# Patient Record
Sex: FEMALE | Race: OTHER | HISPANIC OR LATINO
[De-identification: names, ages, dates, MRNs, and addresses within clinical notes are randomized per-mention and may not be internally consistent; named-entity substitution may affect disease eponyms.]

---

## 2017-05-18 ENCOUNTER — APPOINTMENT (OUTPATIENT)
Dept: GASTROENTEROLOGY | Facility: CLINIC | Age: 57
End: 2017-05-18

## 2017-05-18 VITALS
SYSTOLIC BLOOD PRESSURE: 130 MMHG | RESPIRATION RATE: 16 BRPM | WEIGHT: 144 LBS | HEIGHT: 58 IN | BODY MASS INDEX: 30.23 KG/M2 | HEART RATE: 85 BPM | OXYGEN SATURATION: 98 % | DIASTOLIC BLOOD PRESSURE: 87 MMHG

## 2017-05-18 DIAGNOSIS — K58.1 IRRITABLE BOWEL SYNDROME WITH CONSTIPATION: ICD-10-CM

## 2017-05-18 PROBLEM — Z00.00 ENCOUNTER FOR PREVENTIVE HEALTH EXAMINATION: Status: ACTIVE | Noted: 2017-05-18

## 2017-08-07 LAB
ALBUMIN SERPL ELPH-MCNC: 4.3 G/DL
ALP BLD-CCNC: 97 U/L
ALT SERPL-CCNC: 14 U/L
AMYLASE/CREAT SERPL: 123 U/L
ANION GAP SERPL CALC-SCNC: 13 MMOL/L
APPEARANCE: CLEAR
AST SERPL-CCNC: 16 U/L
BACTERIA: ABNORMAL
BASOPHILS # BLD AUTO: 0.01 K/UL
BASOPHILS NFR BLD AUTO: 0.2 %
BILIRUB SERPL-MCNC: 0.3 MG/DL
BILIRUBIN URINE: NEGATIVE
BLOOD URINE: NEGATIVE
BUN SERPL-MCNC: 12 MG/DL
CALCIUM SERPL-MCNC: 9.2 MG/DL
CHLORIDE SERPL-SCNC: 103 MMOL/L
CO2 SERPL-SCNC: 26 MMOL/L
COLOR: YELLOW
CREAT SERPL-MCNC: 0.74 MG/DL
EOSINOPHIL # BLD AUTO: 0.11 K/UL
EOSINOPHIL NFR BLD AUTO: 1.9 %
GLUCOSE QUALITATIVE U: NORMAL MG/DL
GLUCOSE SERPL-MCNC: 92 MG/DL
HCT VFR BLD CALC: 44.7 %
HGB BLD-MCNC: 14.8 G/DL
HYALINE CASTS: 2 /LPF
IMM GRANULOCYTES NFR BLD AUTO: 0.2 %
KETONES URINE: NEGATIVE
LEUKOCYTE ESTERASE URINE: ABNORMAL
LPL SERPL-CCNC: 49 U/L
LYMPHOCYTES # BLD AUTO: 2.52 K/UL
LYMPHOCYTES NFR BLD AUTO: 43.8 %
MAN DIFF?: NORMAL
MCHC RBC-ENTMCNC: 29.2 PG
MCHC RBC-ENTMCNC: 33.1 GM/DL
MCV RBC AUTO: 88.3 FL
MICROSCOPIC-UA: NORMAL
MONOCYTES # BLD AUTO: 0.49 K/UL
MONOCYTES NFR BLD AUTO: 8.5 %
NEUTROPHILS # BLD AUTO: 2.61 K/UL
NEUTROPHILS NFR BLD AUTO: 45.4 %
NITRITE URINE: NEGATIVE
PH URINE: 6.5
PLATELET # BLD AUTO: 263 K/UL
POTASSIUM SERPL-SCNC: 4.3 MMOL/L
PROT SERPL-MCNC: 7.4 G/DL
PROTEIN URINE: NEGATIVE MG/DL
RBC # BLD: 5.06 M/UL
RBC # FLD: 13.3 %
RED BLOOD CELLS URINE: 10 /HPF
SODIUM SERPL-SCNC: 142 MMOL/L
SPECIFIC GRAVITY URINE: 1.02
SQUAMOUS EPITHELIAL CELLS: 12 /HPF
TSH SERPL-ACNC: 1.78 UIU/ML
UROBILINOGEN URINE: NORMAL MG/DL
WBC # FLD AUTO: 5.75 K/UL
WHITE BLOOD CELLS URINE: 8 /HPF

## 2017-08-14 ENCOUNTER — OUTPATIENT (OUTPATIENT)
Dept: OUTPATIENT SERVICES | Facility: HOSPITAL | Age: 57
LOS: 1 days | End: 2017-08-14
Payer: COMMERCIAL

## 2017-08-14 ENCOUNTER — APPOINTMENT (OUTPATIENT)
Dept: GASTROENTEROLOGY | Facility: GI CENTER | Age: 57
End: 2017-08-14
Payer: MEDICAID

## 2017-08-14 DIAGNOSIS — Z12.11 ENCOUNTER FOR SCREENING FOR MALIGNANT NEOPLASM OF COLON: ICD-10-CM

## 2017-08-14 PROCEDURE — T1013: CPT

## 2017-08-14 PROCEDURE — G0121: CPT

## 2017-08-14 PROCEDURE — 45378 DIAGNOSTIC COLONOSCOPY: CPT

## 2019-09-27 ENCOUNTER — APPOINTMENT (OUTPATIENT)
Dept: GASTROENTEROLOGY | Facility: CLINIC | Age: 59
End: 2019-09-27
Payer: MEDICAID

## 2019-09-27 VITALS
SYSTOLIC BLOOD PRESSURE: 115 MMHG | RESPIRATION RATE: 14 BRPM | HEART RATE: 76 BPM | WEIGHT: 147 LBS | HEIGHT: 58 IN | BODY MASS INDEX: 30.86 KG/M2 | DIASTOLIC BLOOD PRESSURE: 83 MMHG | OXYGEN SATURATION: 98 %

## 2019-09-27 DIAGNOSIS — Z78.9 OTHER SPECIFIED HEALTH STATUS: ICD-10-CM

## 2019-09-27 PROCEDURE — 99214 OFFICE O/P EST MOD 30 MIN: CPT

## 2019-09-27 RX ORDER — POLYETHYLENE GLYOCOL 3350, SODIUM CHLORIDE, SODIUM BICARBONATE AND POTASSIUM CHLORIDE 420; 11.2; 5.72; 1.48 G/4L; G/4L; G/4L; G/4L
420 POWDER, FOR SOLUTION NASOGASTRIC; ORAL
Qty: 1 | Refills: 0 | Status: COMPLETED | COMMUNITY
Start: 2017-05-18 | End: 2019-09-27

## 2019-09-27 NOTE — ASSESSMENT
[FreeTextEntry1] : The patient likely had an acute infectious gastroenteritis and has a residual esophagitis.  Will start pantoprazole 40 mg daily and schedule an EGD for further evaluation.

## 2019-09-27 NOTE — PHYSICAL EXAM
[General Appearance - In No Acute Distress] : in no acute distress [General Appearance - Alert] : alert [Sclera] : the sclera and conjunctiva were normal [Extraocular Movements] : extraocular movements were intact [Outer Ear] : the ears and nose were normal in appearance [PERRL With Normal Accommodation] : pupils were equal in size, round, and reactive to light [Oropharynx] : the oropharynx was normal [Jugular Venous Distention Increased] : there was no jugular-venous distention [Neck Appearance] : the appearance of the neck was normal [Neck Cervical Mass (___cm)] : no neck mass was observed [Thyroid Diffuse Enlargement] : the thyroid was not enlarged [Thyroid Nodule] : there were no palpable thyroid nodules [Auscultation Breath Sounds / Voice Sounds] : lungs were clear to auscultation bilaterally [Heart Rate And Rhythm] : heart rate was normal and rhythm regular [Murmurs] : no murmurs [Heart Sounds] : normal S1 and S2 [Heart Sounds Gallop] : no gallops [Edema] : there was no peripheral edema [Heart Sounds Pericardial Friction Rub] : no pericardial rub [Abdomen Tenderness] : non-tender [Abdomen Soft] : soft [Bowel Sounds] : normal bowel sounds [] : no hepato-splenomegaly [Abdomen Mass (___ Cm)] : no abdominal mass palpated [Cervical Lymph Nodes Enlarged Anterior Bilaterally] : anterior cervical [Cervical Lymph Nodes Enlarged Posterior Bilaterally] : posterior cervical [Nail Clubbing] : no clubbing  or cyanosis of the fingernails [Supraclavicular Lymph Nodes Enlarged Bilaterally] : supraclavicular [No Focal Deficits] : no focal deficits [Skin Color & Pigmentation] : normal skin color and pigmentation [Skin Turgor] : normal skin turgor [Oriented To Time, Place, And Person] : oriented to person, place, and time [Impaired Insight] : insight and judgment were intact [Affect] : the affect was normal

## 2019-09-27 NOTE — HISTORY OF PRESENT ILLNESS
[de-identified] : This is a 58-year-old woman presenting for evaluation for new onset acid reflux.  She is accompanied by her daughter who provides translation.  Patient reports that approximately 2 months ago, she had an acute gastroenteritis that involved one day of profuse vomiting, diarrhea, and left lower quadrant pain.  Since that time, she feels like food is coming back up into her throat.  She has not had any further episodes of vomiting.  She has never undergone an EGD before.  Her weight has been stable.  She denies any melena or rectal bleeding.

## 2019-09-27 NOTE — CONSULT LETTER
[Consult Letter:] : I had the pleasure of evaluating your patient, [unfilled]. [Dear  ___] : Dear  [unfilled], [Consult Closing:] : Thank you very much for allowing me to participate in the care of this patient.  If you have any questions, please do not hesitate to contact me. [Please see my note below.] : Please see my note below. [FreeTextEntry3] : Very truly yours,\par \par GABBY Tucker MD\par [facility]\par Gastroenterology at [place]\par [address], [address]\[place] [address]\par Tel [phone]\par Fax [phone]

## 2019-12-19 ENCOUNTER — RESULT REVIEW (OUTPATIENT)
Age: 59
End: 2019-12-19

## 2019-12-19 ENCOUNTER — OUTPATIENT (OUTPATIENT)
Dept: OUTPATIENT SERVICES | Facility: HOSPITAL | Age: 59
LOS: 1 days | End: 2019-12-19
Payer: COMMERCIAL

## 2019-12-19 ENCOUNTER — APPOINTMENT (OUTPATIENT)
Dept: GASTROENTEROLOGY | Facility: GI CENTER | Age: 59
End: 2019-12-19
Payer: MEDICAID

## 2019-12-19 DIAGNOSIS — K21.9 GASTRO-ESOPHAGEAL REFLUX DISEASE WITHOUT ESOPHAGITIS: ICD-10-CM

## 2019-12-19 DIAGNOSIS — K29.70 GASTRITIS, UNSPECIFIED, W/OUT BLEEDING: ICD-10-CM

## 2019-12-19 DIAGNOSIS — K25.7 CHRONIC GASTRIC ULCER W/OUT HEMORRHAGE OR PERFORATION: ICD-10-CM

## 2019-12-19 PROCEDURE — 44361 SMALL BOWEL ENDOSCOPY/BIOPSY: CPT

## 2019-12-19 PROCEDURE — 88305 TISSUE EXAM BY PATHOLOGIST: CPT | Mod: 26

## 2019-12-19 PROCEDURE — 43239 EGD BIOPSY SINGLE/MULTIPLE: CPT

## 2019-12-19 PROCEDURE — 88305 TISSUE EXAM BY PATHOLOGIST: CPT

## 2019-12-19 PROCEDURE — 88342 IMHCHEM/IMCYTCHM 1ST ANTB: CPT | Mod: 26

## 2019-12-19 PROCEDURE — 88342 IMHCHEM/IMCYTCHM 1ST ANTB: CPT

## 2019-12-19 RX ORDER — PANTOPRAZOLE 40 MG/1
40 TABLET, DELAYED RELEASE ORAL DAILY
Qty: 1 | Refills: 3 | Status: ACTIVE | COMMUNITY
Start: 2019-09-27 | End: 1900-01-01

## 2019-12-19 NOTE — PHYSICAL EXAM
[General Appearance - Alert] : alert [General Appearance - In No Acute Distress] : in no acute distress [PERRL With Normal Accommodation] : pupils were equal in size, round, and reactive to light [Sclera] : the sclera and conjunctiva were normal [Oropharynx] : the oropharynx was normal [Outer Ear] : the ears and nose were normal in appearance [Extraocular Movements] : extraocular movements were intact [Neck Appearance] : the appearance of the neck was normal [Neck Cervical Mass (___cm)] : no neck mass was observed [Thyroid Diffuse Enlargement] : the thyroid was not enlarged [Jugular Venous Distention Increased] : there was no jugular-venous distention [Thyroid Nodule] : there were no palpable thyroid nodules [Auscultation Breath Sounds / Voice Sounds] : lungs were clear to auscultation bilaterally [Heart Sounds] : normal S1 and S2 [Heart Rate And Rhythm] : heart rate was normal and rhythm regular [Murmurs] : no murmurs [Heart Sounds Pericardial Friction Rub] : no pericardial rub [Heart Sounds Gallop] : no gallops [Edema] : there was no peripheral edema [Bowel Sounds] : normal bowel sounds [Abdomen Soft] : soft [Abdomen Mass (___ Cm)] : no abdominal mass palpated [Abdomen Tenderness] : non-tender [] : no hepato-splenomegaly [Cervical Lymph Nodes Enlarged Anterior Bilaterally] : anterior cervical [Supraclavicular Lymph Nodes Enlarged Bilaterally] : supraclavicular [Cervical Lymph Nodes Enlarged Posterior Bilaterally] : posterior cervical [Skin Color & Pigmentation] : normal skin color and pigmentation [Nail Clubbing] : no clubbing  or cyanosis of the fingernails [Skin Turgor] : normal skin turgor [Oriented To Time, Place, And Person] : oriented to person, place, and time [No Focal Deficits] : no focal deficits [Affect] : the affect was normal [Impaired Insight] : insight and judgment were intact

## 2019-12-19 NOTE — ASSESSMENT
[FreeTextEntry1] : This is a 58-year-old woman presenting for an EGD to evaluate for history of gastroesophageal reflux disease.  The exam was notable for a regular Z line at 35 cm in the incisor orifice.  The gastric fundus and body were grossly normal in appearance, but the antrum was notable for patchy erythema and 2-3 small chronic-appearing erosions.  Biopsies were taken from the antrum (jar one) and the gastric body (jar two) to evaluate for Helicobacter pylori and gastric intestinal metaplasia.  The duodenal bulb, second portion of the duodenum, and third portion of the duodenum were grossly normal in appearance.\par \par Followup pathology\par PPI

## 2019-12-19 NOTE — PROCEDURE
[With Biopsy] : with biopsy [GERD] : GERD [Procedure Explained] : The procedure was explained [Allergies Reviewed] : allergies reviewed. [Risks] : Risks [Benefits] : benefits [Alternatives] : alternatives [Patient] : the patient [Consent Obtained] : written consent was obtained prior to the procedure and is detailed in the patient's record [Cardiac Monitor] : cardiac monitor [Automated Blood Pressure Cuff] : automated blood pressure cuff [Pulse Oximeter] : pulse oximeter [Propofol ___ mg IV] : Propofol [unfilled] ~Umg intravenously [2] : 2 [Performed By: ___] : Performed by:  OLGA [Sedation Clearance] : the patient was cleared for moderate sedation [Erosion] : erosion [Erythema] : erythema [Normal] : Normal [Sent to Pathology] : was sent to pathology for analysis [Tolerated Well] : the patient tolerated the procedure well [Vital Signs Stable] : the vital signs were stable [de-identified] : [employer] [id number]  [de-identified] : Z-line regular at 35 cm from incisors

## 2019-12-19 NOTE — PHYSICAL EXAM
[General Appearance - Alert] : alert [General Appearance - In No Acute Distress] : in no acute distress [PERRL With Normal Accommodation] : pupils were equal in size, round, and reactive to light [Sclera] : the sclera and conjunctiva were normal [Outer Ear] : the ears and nose were normal in appearance [Oropharynx] : the oropharynx was normal [Extraocular Movements] : extraocular movements were intact [Jugular Venous Distention Increased] : there was no jugular-venous distention [Neck Appearance] : the appearance of the neck was normal [Neck Cervical Mass (___cm)] : no neck mass was observed [Thyroid Diffuse Enlargement] : the thyroid was not enlarged [Thyroid Nodule] : there were no palpable thyroid nodules [Auscultation Breath Sounds / Voice Sounds] : lungs were clear to auscultation bilaterally [Heart Sounds] : normal S1 and S2 [Heart Rate And Rhythm] : heart rate was normal and rhythm regular [Murmurs] : no murmurs [Heart Sounds Pericardial Friction Rub] : no pericardial rub [Heart Sounds Gallop] : no gallops [Bowel Sounds] : normal bowel sounds [Edema] : there was no peripheral edema [Abdomen Soft] : soft [Abdomen Tenderness] : non-tender [] : no hepato-splenomegaly [Abdomen Mass (___ Cm)] : no abdominal mass palpated [Supraclavicular Lymph Nodes Enlarged Bilaterally] : supraclavicular [Cervical Lymph Nodes Enlarged Anterior Bilaterally] : anterior cervical [Cervical Lymph Nodes Enlarged Posterior Bilaterally] : posterior cervical [Skin Color & Pigmentation] : normal skin color and pigmentation [Nail Clubbing] : no clubbing  or cyanosis of the fingernails [Skin Turgor] : normal skin turgor [No Focal Deficits] : no focal deficits [Oriented To Time, Place, And Person] : oriented to person, place, and time [Impaired Insight] : insight and judgment were intact [Affect] : the affect was normal

## 2019-12-19 NOTE — PROCEDURE
[With Biopsy] : with biopsy [GERD] : GERD [Procedure Explained] : The procedure was explained [Allergies Reviewed] : allergies reviewed. [Risks] : Risks [Alternatives] : alternatives [Benefits] : benefits [Consent Obtained] : written consent was obtained prior to the procedure and is detailed in the patient's record [Patient] : the patient [Automated Blood Pressure Cuff] : automated blood pressure cuff [Cardiac Monitor] : cardiac monitor [Pulse Oximeter] : pulse oximeter [Propofol ___ mg IV] : Propofol [unfilled] ~Umg intravenously [2] : 2 [Sedation Clearance] : the patient was cleared for moderate sedation [Performed By: ___] : Performed by:  OLGA [Erosion] : erosion [Erythema] : erythema [Normal] : Normal [Sent to Pathology] : was sent to pathology for analysis [Tolerated Well] : the patient tolerated the procedure well [Vital Signs Stable] : the vital signs were stable [de-identified] : Olympus [id number]  [de-identified] : Z-line regular at 35 cm from incisors

## 2020-01-03 LAB — SURGICAL PATHOLOGY STUDY: SIGNIFICANT CHANGE UP

## 2020-01-07 ENCOUNTER — OTHER (OUTPATIENT)
Age: 60
End: 2020-01-07

## 2020-03-06 ENCOUNTER — APPOINTMENT (OUTPATIENT)
Dept: GASTROENTEROLOGY | Facility: CLINIC | Age: 60
End: 2020-03-06
Payer: MEDICAID

## 2020-03-06 VITALS
RESPIRATION RATE: 16 BRPM | HEIGHT: 58 IN | SYSTOLIC BLOOD PRESSURE: 108 MMHG | WEIGHT: 143 LBS | DIASTOLIC BLOOD PRESSURE: 72 MMHG | OXYGEN SATURATION: 98 % | HEART RATE: 68 BPM | BODY MASS INDEX: 30.02 KG/M2

## 2020-03-06 PROCEDURE — 99213 OFFICE O/P EST LOW 20 MIN: CPT

## 2020-03-06 RX ORDER — AMOXICILLIN 500 MG/1
500 TABLET, FILM COATED ORAL TWICE DAILY
Qty: 56 | Refills: 0 | Status: COMPLETED | COMMUNITY
Start: 2020-01-07 | End: 2020-03-06

## 2020-03-06 RX ORDER — METRONIDAZOLE 500 MG/1
500 TABLET ORAL TWICE DAILY
Qty: 28 | Refills: 0 | Status: COMPLETED | COMMUNITY
Start: 2020-01-07 | End: 2020-03-06

## 2020-03-06 NOTE — CONSULT LETTER
[Dear  ___] : Dear  [unfilled], [Courtesy Letter:] : I had the pleasure of seeing your patient, [unfilled], in my office today. [Please see my note below.] : Please see my note below. [Consult Closing:] : Thank you very much for allowing me to participate in the care of this patient.  If you have any questions, please do not hesitate to contact me. [FreeTextEntry3] : Very truly yours,\par \par GABBY Tucker MD\par  [facility]\par Gastroenterology at [place]\par [address], [address]\[place] [address]\par Tel [phone]\par Fax [phone]

## 2020-03-06 NOTE — REASON FOR VISIT
[Follow-Up: _____] : a [unfilled] follow-up visit [Other: _____] : [unfilled] [[employer] ] : provided by [employer]   [FreeTextEntry1] : [id number] [FreeTextEntry2] : Nima [TWNoteComboBox1] : Tagalog

## 2020-03-06 NOTE — HISTORY OF PRESENT ILLNESS
[de-identified] : Patient returns for follow up for H. pylori infection.  She completed the antibiotic course.  Currently taking omeprazole.  Symptoms under good control.  Reports some nausea and vomiting after eating fried food.  Does not have those symptoms when she avoids those foods.  No other complaints.

## 2022-07-05 ENCOUNTER — APPOINTMENT (OUTPATIENT)
Dept: GASTROENTEROLOGY | Facility: CLINIC | Age: 62
End: 2022-07-05

## 2022-07-05 VITALS
BODY MASS INDEX: 30.23 KG/M2 | WEIGHT: 144 LBS | HEART RATE: 74 BPM | SYSTOLIC BLOOD PRESSURE: 112 MMHG | RESPIRATION RATE: 14 BRPM | DIASTOLIC BLOOD PRESSURE: 78 MMHG | OXYGEN SATURATION: 98 % | HEIGHT: 58 IN

## 2022-07-05 DIAGNOSIS — R10.9 UNSPECIFIED ABDOMINAL PAIN: ICD-10-CM

## 2022-07-05 DIAGNOSIS — K62.5 HEMORRHAGE OF ANUS AND RECTUM: ICD-10-CM

## 2022-07-05 PROCEDURE — 99214 OFFICE O/P EST MOD 30 MIN: CPT

## 2022-07-06 NOTE — ASSESSMENT
[FreeTextEntry1] : 61 F with PMH of HTN, HLD presents for evaluation of epigastric pain. Patient states she has been having symptoms for 3 months, progressively getting worse. Associated with some nausea, no vomiting. Worse with eating. \par \par Pain abdomen:\par -will check labs today\par -c/w PPI, advised to increase dose to bid for now\par -will get CT abdomen \par -further plan pending work up above\par \par Hematochezia: one episode\par -workup as above \par -cscope 2017 was normal\par -further plan after above workup\par

## 2022-07-06 NOTE — HISTORY OF PRESENT ILLNESS
[de-identified] :  id [id number] \par \par 61 F with PMH of HTN, HLD presents for evaluation of epigastric pain. Patient states she has been having symptoms for 3 months, progressively getting worse. Associated with some nausea, no vomiting. Worse with eating. \par \par Pt was previously seen by Dr Tucker and had undergone an EGD at that time and was found to have H.Pylori, reports was prescribed meds for the same, not sure if she was tested for eradication. Has been taking ppi as prescribed by her pcp that has been helping.\par No fever/chills/black stool\par Also reports some constipation and one episode few months ago when she saw small amount of blood on toilet paper\par \par Last c scope 2017 normal, due for repeat in 2027\par last egd 2019\par \par no f/h/o gi malignancy

## 2022-07-06 NOTE — PHYSICAL EXAM
[General Appearance - Alert] : alert [General Appearance - In No Acute Distress] : in no acute distress [Sclera] : the sclera and conjunctiva were normal [Outer Ear] : the ears and nose were normal in appearance [Neck Appearance] : the appearance of the neck was normal [Normal] : the heart rate was normal [Abdomen Soft] : soft [No CVA Tenderness] : no ~M costovertebral angle tenderness [Abnormal Walk] : normal gait [Skin Color & Pigmentation] : normal skin color and pigmentation [] : no rash [Oriented To Time, Place, And Person] : oriented to person, place, and time [FreeTextEntry1] : yellow stool

## 2022-07-15 ENCOUNTER — APPOINTMENT (OUTPATIENT)
Dept: CT IMAGING | Facility: CLINIC | Age: 62
End: 2022-07-15

## 2022-07-15 ENCOUNTER — OUTPATIENT (OUTPATIENT)
Dept: OUTPATIENT SERVICES | Facility: HOSPITAL | Age: 62
LOS: 1 days | End: 2022-07-15
Payer: MEDICAID

## 2022-07-15 DIAGNOSIS — R10.9 UNSPECIFIED ABDOMINAL PAIN: ICD-10-CM

## 2022-07-15 PROCEDURE — 74177 CT ABD & PELVIS W/CONTRAST: CPT | Mod: 26

## 2022-07-15 PROCEDURE — 74177 CT ABD & PELVIS W/CONTRAST: CPT

## 2022-07-25 LAB
ALBUMIN SERPL ELPH-MCNC: 4.7 G/DL
ALP BLD-CCNC: 90 U/L
ALT SERPL-CCNC: 28 U/L
ANION GAP SERPL CALC-SCNC: 13 MMOL/L
AST SERPL-CCNC: 28 U/L
BASOPHILS # BLD AUTO: 0.02 K/UL
BASOPHILS NFR BLD AUTO: 0.3 %
BILIRUB SERPL-MCNC: 0.4 MG/DL
BUN SERPL-MCNC: 12 MG/DL
CALCIUM SERPL-MCNC: 10.1 MG/DL
CHLORIDE SERPL-SCNC: 102 MMOL/L
CO2 SERPL-SCNC: 25 MMOL/L
CREAT SERPL-MCNC: 0.71 MG/DL
EGFR: 97 ML/MIN/1.73M2
EOSINOPHIL # BLD AUTO: 0.18 K/UL
EOSINOPHIL NFR BLD AUTO: 2.8 %
GLIADIN IGA SER QL: <5 UNITS
GLIADIN IGG SER QL: <5 UNITS
GLIADIN PEPTIDE IGA SER-ACNC: NEGATIVE
GLIADIN PEPTIDE IGG SER-ACNC: NEGATIVE
GLUCOSE SERPL-MCNC: 108 MG/DL
HCT VFR BLD CALC: 45.6 %
HGB BLD-MCNC: 14.9 G/DL
IGA SER QL IEP: 308 MG/DL
IMM GRANULOCYTES NFR BLD AUTO: 0.2 %
LYMPHOCYTES # BLD AUTO: 2.61 K/UL
LYMPHOCYTES NFR BLD AUTO: 40 %
MAN DIFF?: NORMAL
MCHC RBC-ENTMCNC: 29 PG
MCHC RBC-ENTMCNC: 32.7 GM/DL
MCV RBC AUTO: 88.9 FL
MONOCYTES # BLD AUTO: 0.56 K/UL
MONOCYTES NFR BLD AUTO: 8.6 %
NEUTROPHILS # BLD AUTO: 3.15 K/UL
NEUTROPHILS NFR BLD AUTO: 48.1 %
PLATELET # BLD AUTO: 293 K/UL
POTASSIUM SERPL-SCNC: 4.8 MMOL/L
PROT SERPL-MCNC: 7.4 G/DL
RBC # BLD: 5.13 M/UL
RBC # FLD: 12.8 %
SODIUM SERPL-SCNC: 140 MMOL/L
TTG IGA SER IA-ACNC: <1.2 U/ML
TTG IGA SER-ACNC: NEGATIVE
TTG IGG SER IA-ACNC: <1.2 U/ML
TTG IGG SER IA-ACNC: NEGATIVE
UREA BREATH TEST QL: NEGATIVE
WBC # FLD AUTO: 6.53 K/UL

## 2023-04-09 ENCOUNTER — NON-APPOINTMENT (OUTPATIENT)
Age: 63
End: 2023-04-09

## 2024-04-24 ENCOUNTER — INPATIENT (INPATIENT)
Facility: HOSPITAL | Age: 64
LOS: 3 days | Discharge: HOME | DRG: 392 | End: 2024-04-28
Attending: HOSPITALIST | Admitting: HOSPITALIST
Payer: COMMERCIAL

## 2024-04-24 VITALS
TEMPERATURE: 98 F | SYSTOLIC BLOOD PRESSURE: 138 MMHG | HEART RATE: 79 BPM | RESPIRATION RATE: 18 BRPM | OXYGEN SATURATION: 94 % | DIASTOLIC BLOOD PRESSURE: 94 MMHG

## 2024-04-24 LAB
ALBUMIN SERPL ELPH-MCNC: 4.2 G/DL — SIGNIFICANT CHANGE UP (ref 3.3–5.2)
ALP SERPL-CCNC: 95 U/L — SIGNIFICANT CHANGE UP (ref 40–120)
ALT FLD-CCNC: 20 U/L — SIGNIFICANT CHANGE UP
ANION GAP SERPL CALC-SCNC: 13 MMOL/L — SIGNIFICANT CHANGE UP (ref 5–17)
AST SERPL-CCNC: 22 U/L — SIGNIFICANT CHANGE UP
BASOPHILS # BLD AUTO: 0.01 K/UL — SIGNIFICANT CHANGE UP (ref 0–0.2)
BASOPHILS NFR BLD AUTO: 0.1 % — SIGNIFICANT CHANGE UP (ref 0–2)
BILIRUB SERPL-MCNC: 0.3 MG/DL — LOW (ref 0.4–2)
BUN SERPL-MCNC: 11.5 MG/DL — SIGNIFICANT CHANGE UP (ref 8–20)
CALCIUM SERPL-MCNC: 9.5 MG/DL — SIGNIFICANT CHANGE UP (ref 8.4–10.5)
CHLORIDE SERPL-SCNC: 101 MMOL/L — SIGNIFICANT CHANGE UP (ref 96–108)
CO2 SERPL-SCNC: 24 MMOL/L — SIGNIFICANT CHANGE UP (ref 22–29)
CREAT SERPL-MCNC: 0.65 MG/DL — SIGNIFICANT CHANGE UP (ref 0.5–1.3)
D DIMER BLD IA.RAPID-MCNC: <150 NG/ML DDU — SIGNIFICANT CHANGE UP
EGFR: 99 ML/MIN/1.73M2 — SIGNIFICANT CHANGE UP
EOSINOPHIL # BLD AUTO: 0.11 K/UL — SIGNIFICANT CHANGE UP (ref 0–0.5)
EOSINOPHIL NFR BLD AUTO: 1.2 % — SIGNIFICANT CHANGE UP (ref 0–6)
GLUCOSE SERPL-MCNC: 110 MG/DL — HIGH (ref 70–99)
HCT VFR BLD CALC: 43.8 % — SIGNIFICANT CHANGE UP (ref 34.5–45)
HGB BLD-MCNC: 15.1 G/DL — SIGNIFICANT CHANGE UP (ref 11.5–15.5)
IMM GRANULOCYTES NFR BLD AUTO: 0.3 % — SIGNIFICANT CHANGE UP (ref 0–0.9)
LACTATE BLDV-MCNC: 1.5 MMOL/L — SIGNIFICANT CHANGE UP (ref 0.5–2)
LYMPHOCYTES # BLD AUTO: 3.41 K/UL — HIGH (ref 1–3.3)
LYMPHOCYTES # BLD AUTO: 37.6 % — SIGNIFICANT CHANGE UP (ref 13–44)
MCHC RBC-ENTMCNC: 30 PG — SIGNIFICANT CHANGE UP (ref 27–34)
MCHC RBC-ENTMCNC: 34.5 GM/DL — SIGNIFICANT CHANGE UP (ref 32–36)
MCV RBC AUTO: 86.9 FL — SIGNIFICANT CHANGE UP (ref 80–100)
MONOCYTES # BLD AUTO: 0.68 K/UL — SIGNIFICANT CHANGE UP (ref 0–0.9)
MONOCYTES NFR BLD AUTO: 7.5 % — SIGNIFICANT CHANGE UP (ref 2–14)
NEUTROPHILS # BLD AUTO: 4.82 K/UL — SIGNIFICANT CHANGE UP (ref 1.8–7.4)
NEUTROPHILS NFR BLD AUTO: 53.3 % — SIGNIFICANT CHANGE UP (ref 43–77)
PLATELET # BLD AUTO: 265 K/UL — SIGNIFICANT CHANGE UP (ref 150–400)
POTASSIUM SERPL-MCNC: 3.9 MMOL/L — SIGNIFICANT CHANGE UP (ref 3.5–5.3)
POTASSIUM SERPL-SCNC: 3.9 MMOL/L — SIGNIFICANT CHANGE UP (ref 3.5–5.3)
PROT SERPL-MCNC: 7.3 G/DL — SIGNIFICANT CHANGE UP (ref 6.6–8.7)
RBC # BLD: 5.04 M/UL — SIGNIFICANT CHANGE UP (ref 3.8–5.2)
RBC # FLD: 12.7 % — SIGNIFICANT CHANGE UP (ref 10.3–14.5)
SODIUM SERPL-SCNC: 138 MMOL/L — SIGNIFICANT CHANGE UP (ref 135–145)
WBC # BLD: 9.06 K/UL — SIGNIFICANT CHANGE UP (ref 3.8–10.5)
WBC # FLD AUTO: 9.06 K/UL — SIGNIFICANT CHANGE UP (ref 3.8–10.5)

## 2024-04-24 PROCEDURE — 99285 EMERGENCY DEPT VISIT HI MDM: CPT

## 2024-04-24 PROCEDURE — 71046 X-RAY EXAM CHEST 2 VIEWS: CPT | Mod: 26

## 2024-04-24 RX ORDER — KETOROLAC TROMETHAMINE 30 MG/ML
15 SYRINGE (ML) INJECTION ONCE
Refills: 0 | Status: DISCONTINUED | OUTPATIENT
Start: 2024-04-24 | End: 2024-04-24

## 2024-04-24 RX ORDER — MORPHINE SULFATE 50 MG/1
4 CAPSULE, EXTENDED RELEASE ORAL ONCE
Refills: 0 | Status: DISCONTINUED | OUTPATIENT
Start: 2024-04-24 | End: 2024-04-24

## 2024-04-24 RX ADMIN — Medication 15 MILLIGRAM(S): at 23:26

## 2024-04-24 RX ADMIN — MORPHINE SULFATE 4 MILLIGRAM(S): 50 CAPSULE, EXTENDED RELEASE ORAL at 23:26

## 2024-04-24 NOTE — ED ADULT TRIAGE NOTE - CHIEF COMPLAINT QUOTE
Pt BIBA from home, c/o RUQ abdominal pain on/off for 3 weeks, 9/10 sharp pain.  Pt states hx of constipation, LBM 2 days ago. Pt also c/o dizziness.  Hx of HTN/HLD, cholecystomy 1999 Pt BIBA from home, c/o RUQ abdominal pain on/off for 3 weeks w/nausea, 9/10 sharp pain.  Pt states hx of constipation, LBM 2 days ago. Pt also c/o dizziness.  Hx of HTN/HLD, cholecystomy 1999.  Last ibup at 5:30pm

## 2024-04-25 DIAGNOSIS — Z90.710 ACQUIRED ABSENCE OF BOTH CERVIX AND UTERUS: Chronic | ICD-10-CM

## 2024-04-25 DIAGNOSIS — R10.11 RIGHT UPPER QUADRANT PAIN: ICD-10-CM

## 2024-04-25 DIAGNOSIS — Z90.49 ACQUIRED ABSENCE OF OTHER SPECIFIED PARTS OF DIGESTIVE TRACT: Chronic | ICD-10-CM

## 2024-04-25 LAB
INR BLD: 1.09 RATIO — SIGNIFICANT CHANGE UP (ref 0.85–1.18)
PROTHROM AB SERPL-ACNC: 12.1 SEC — SIGNIFICANT CHANGE UP (ref 9.5–13)

## 2024-04-25 PROCEDURE — 74177 CT ABD & PELVIS W/CONTRAST: CPT | Mod: 26,MC

## 2024-04-25 PROCEDURE — 76705 ECHO EXAM OF ABDOMEN: CPT | Mod: 26

## 2024-04-25 PROCEDURE — 99223 1ST HOSP IP/OBS HIGH 75: CPT

## 2024-04-25 PROCEDURE — 93010 ELECTROCARDIOGRAM REPORT: CPT

## 2024-04-25 PROCEDURE — 74183 MRI ABD W/O CNTR FLWD CNTR: CPT | Mod: 26

## 2024-04-25 PROCEDURE — 99222 1ST HOSP IP/OBS MODERATE 55: CPT

## 2024-04-25 RX ORDER — SODIUM CHLORIDE 9 MG/ML
1000 INJECTION INTRAMUSCULAR; INTRAVENOUS; SUBCUTANEOUS
Refills: 0 | Status: DISCONTINUED | OUTPATIENT
Start: 2024-04-25 | End: 2024-04-28

## 2024-04-25 RX ORDER — TRAMADOL HYDROCHLORIDE 50 MG/1
25 TABLET ORAL EVERY 6 HOURS
Refills: 0 | Status: DISCONTINUED | OUTPATIENT
Start: 2024-04-25 | End: 2024-04-25

## 2024-04-25 RX ORDER — ATORVASTATIN CALCIUM 80 MG/1
1 TABLET, FILM COATED ORAL
Refills: 0 | DISCHARGE

## 2024-04-25 RX ORDER — LANOLIN ALCOHOL/MO/W.PET/CERES
3 CREAM (GRAM) TOPICAL AT BEDTIME
Refills: 0 | Status: DISCONTINUED | OUTPATIENT
Start: 2024-04-25 | End: 2024-04-28

## 2024-04-25 RX ORDER — ACETAMINOPHEN 500 MG
650 TABLET ORAL EVERY 6 HOURS
Refills: 0 | Status: DISCONTINUED | OUTPATIENT
Start: 2024-04-25 | End: 2024-04-28

## 2024-04-25 RX ORDER — ERTAPENEM SODIUM 1 G/1
1000 INJECTION, POWDER, LYOPHILIZED, FOR SOLUTION INTRAMUSCULAR; INTRAVENOUS ONCE
Refills: 0 | Status: DISCONTINUED | OUTPATIENT
Start: 2024-04-26 | End: 2024-04-26

## 2024-04-25 RX ORDER — INDOMETHACIN 50 MG
100 CAPSULE ORAL ONCE
Refills: 0 | Status: DISCONTINUED | OUTPATIENT
Start: 2024-04-25 | End: 2024-04-26

## 2024-04-25 RX ORDER — ENOXAPARIN SODIUM 100 MG/ML
40 INJECTION SUBCUTANEOUS EVERY 24 HOURS
Refills: 0 | Status: DISCONTINUED | OUTPATIENT
Start: 2024-04-25 | End: 2024-04-28

## 2024-04-25 RX ORDER — ONDANSETRON 8 MG/1
4 TABLET, FILM COATED ORAL EVERY 8 HOURS
Refills: 0 | Status: DISCONTINUED | OUTPATIENT
Start: 2024-04-25 | End: 2024-04-28

## 2024-04-25 RX ORDER — ATORVASTATIN CALCIUM 80 MG/1
40 TABLET, FILM COATED ORAL AT BEDTIME
Refills: 0 | Status: DISCONTINUED | OUTPATIENT
Start: 2024-04-25 | End: 2024-04-28

## 2024-04-25 RX ORDER — MORPHINE SULFATE 50 MG/1
4 CAPSULE, EXTENDED RELEASE ORAL ONCE
Refills: 0 | Status: DISCONTINUED | OUTPATIENT
Start: 2024-04-25 | End: 2024-04-25

## 2024-04-25 RX ORDER — KETOROLAC TROMETHAMINE 30 MG/ML
15 SYRINGE (ML) INJECTION EVERY 6 HOURS
Refills: 0 | Status: DISCONTINUED | OUTPATIENT
Start: 2024-04-25 | End: 2024-04-26

## 2024-04-25 RX ORDER — MORPHINE SULFATE 50 MG/1
2 CAPSULE, EXTENDED RELEASE ORAL EVERY 4 HOURS
Refills: 0 | Status: DISCONTINUED | OUTPATIENT
Start: 2024-04-25 | End: 2024-04-28

## 2024-04-25 RX ADMIN — Medication 15 MILLIGRAM(S): at 15:46

## 2024-04-25 RX ADMIN — ENOXAPARIN SODIUM 40 MILLIGRAM(S): 100 INJECTION SUBCUTANEOUS at 15:46

## 2024-04-25 RX ADMIN — MORPHINE SULFATE 4 MILLIGRAM(S): 50 CAPSULE, EXTENDED RELEASE ORAL at 05:24

## 2024-04-25 RX ADMIN — SODIUM CHLORIDE 100 MILLILITER(S): 9 INJECTION INTRAMUSCULAR; INTRAVENOUS; SUBCUTANEOUS at 08:52

## 2024-04-25 RX ADMIN — ATORVASTATIN CALCIUM 40 MILLIGRAM(S): 80 TABLET, FILM COATED ORAL at 21:40

## 2024-04-25 NOTE — ED ADULT NURSE REASSESSMENT NOTE - NS ED NURSE REASSESS COMMENT FT1
Pt awake & A&Ox4. Airway patent. Breathing even and unlabored on room air. Denies chest pain & SOB. Pt safety maintained. Pending bed in CDU.

## 2024-04-25 NOTE — ED PROVIDER NOTE - PHYSICAL EXAMINATION
Gen: NAD, AOx3  Head: NCAT  HEENT: oral mucosa moist, normal conjunctiva, oropharynx clear without exudate or erythema  Lung: CTAB, no respiratory distress, no wheezing, rales, rhonchi  CV: normal s1/s2, rrr, no murmurs, Normal perfusion, pulses 2+ throughout  Abd: soft,  diffuse tenderness palpation, however most tenderness in right upper quadrant  MSK: No edema, no visible deformities, full range of motion in all 4 extremities  Neuro: No focal neurologic deficits  Skin: No rash   Psych: normal affect

## 2024-04-25 NOTE — CONSULT NOTE ADULT - ASSESSMENT
64-year-old female with past medical history of HLD, PSH of cholecystectomy (26 years ago), presented to ED with epigastric and RUQ pain x 3 weeks, with associated nausea and NBNB.  emesis x 1 week.    Abdominal pain, nausea, vomiting:  Dilated CBD:   CT abd shows hepatic steatosis. mild intrahepatic and extra hepatic biliary ductal dilatation with common bile duct measuring up to 1.4 cm with distal tapering. No discrete choledocholith. S/p cholecystectomy. US abd shows CBD 1.2 cm.  Liver enzymes normal. No thrombocytopenia, normal albumin level.     Plan:  CT abd with no evidence of choledocholithiasis. Dilated CBD 14 mm is not significant and is a normal finding as she is s/p Cholecystectomy. Her liver enzymes are norm al. No evidence of ascending cholangitis.  Cont to trend LAEs, coags, CBC  MRCP ordered to r/o Choledocholithiasis  Further plan pending MRCP report. May need ERCP if + choledocholithiasis   Can start diet today from GI standpoint  Antiemetic a needed   Further care per primary team

## 2024-04-25 NOTE — ED PROVIDER NOTE - OBJECTIVE STATEMENT
64-year-old female with  no significant past medical history, status post cholecystectomy in [place] approximately 26 years ago presenting with  right upper quadrant pain x 3 weeks, at first was intermittent however  now constant.  No fevers or chills,  no diarrhea, associated with several episodes of nonbloody nonbilious vomiting.

## 2024-04-25 NOTE — H&P ADULT - ASSESSMENT
64 yo F with hx of cholelithiasis s/p cholecystectomy 26 years ago and hyperlipidemia presents to the ED for right upper quadrant abdominal abdominal pain.    RUQ pain, r/o Choledocholithiasis   CBD dilation on CT and US  LFTs WNL  lipase 59   - admit to medicine  - Monitor LFTs  - GI consulted  - MRCP, ordered  - IVFs  - CLD  - Pain control tylenol, toradol and morphine  - Antiemetic a needed     HLD  - resume statin    DVT ppx: Lovenox

## 2024-04-25 NOTE — H&P ADULT - NSHPPHYSICALEXAM_GEN_ALL_CORE
PHYSICAL EXAM:  Constitutional: No acute distress, alert and oriented by 3  HEENT: AT/NC, EOMI, PERRLA, Normal conjunctiva, no pharyngreal erythema, moist oral mucosa  Respiratory: CTA BL, equal breath sounds, no crackles or wheezing  Cardiovascular: RRR, no edema  Gastrointestinal: soft, + RUQ tenderness, Non-distended + Bowel sounds, no rebound or guarding  Genitourinary: no kwok  Musculoskeletal: No joint edema  Neurological: CN 2-12 grossly intact, no focal deficits  Skin: warm, dry and intact  Psychiatric: normal mood and affect

## 2024-04-25 NOTE — ED ADULT NURSE NOTE - CHIEF COMPLAINT QUOTE
Pt BIBA from home, c/o RUQ abdominal pain on/off for 3 weeks w/nausea, 9/10 sharp pain.  Pt states hx of constipation, LBM 2 days ago. Pt also c/o dizziness.  Hx of HTN/HLD, cholecystomy 1999.  Last ibup at 5:30pm

## 2024-04-25 NOTE — ED ADULT NURSE NOTE - OBJECTIVE STATEMENT
patient presents  RUQ abd pain that started few hours pta, patient denies nausea, vomiting, fever or urinary symptoms.  +

## 2024-04-25 NOTE — CONSULT NOTE ADULT - SUBJECTIVE AND OBJECTIVE BOX
HISTORY OF PRESENT ILLNESS: 64-year-old female with past medical history of HLD, PSH of cholecystectomy in [place] approximately 26 years ago presented to ED with epigastric and RUQ pain x 3 weeks. Reported associated nausea and NBNB emesis x 1 week. Denies fever, chills,  diarrhea. In ED CT abd shows hepatic steatosis. mild intrahepatic and extra hepatic biliary ductal dilatation with common bile duct measuring up to 1.4 cm with distal tapering. No discrete choledocholith. S/p cholecystectomy. US abd shows CBD 1.2 cm. Lab reports with normal liver enzymes, britni synthetic function. No leucocytosis. Patient remains afebrile.  Patient denies use of anticoagulation, NSAIDs, or alcohol.     Review of Systems:  . Constitutional: No fever, chills  . HEENT: Negative  · Respiratory and Thorax: No shortness of breath, no cough  · Cardiovascular: No chest pain, palpitation, no dizziness  · Gastrointestinal: see above  · Genitourinary: No hematuria  · Musculoskeletal: Negative  · Neurological: negative  · Psychiatric: no agitation, no anxiety      PAST MEDICAL/SURGICAL HISTORY:    SOCIAL HISTORY:  - TOBACCO: Denies  - ALCOHOL: Denies  - ILLICIT DRUG USE: Denies    FAMILY HISTORY:  No known history of gastrointestinal or liver disease;      HOME MEDICATIONS:    INPATIENT MEDICATIONS:  MEDICATIONS  (STANDING):    MEDICATIONS  (PRN):    ALLERGIES:  No Known Allergies    T(C): 36.4 (04-25-24 @ 05:05), Max: 37.1 (04-24-24 @ 23:39)  HR: 60 (04-25-24 @ 05:05) (60 - 79)  BP: 109/72 (04-25-24 @ 05:05) (109/72 - 138/94)  RR: 20 (04-25-24 @ 05:05) (18 - 20)  SpO2: 99% (04-25-24 @ 05:05) (94% - 99%)      PHYSICAL EXAM:    Constitutional: No acute distress  Neuro: Awake alert, oriented  HEENT: anicteric sclerae  CV: regular rate, regular rhythm  Pulm/chest: lung sounds diminished bilaterally, no accessory muscle use noted  Abd: soft, nontender, nondistended +bowel sounds. No rigidity, rebound tenderness, or guarding  Ext: no edema  Skin: warm, no jaundice   Psych: calm, cooperative      LABS:             15.1   9.06  )-----------( 265      ( 04-24 @ 23:20 )             43.8         04-24    138  |  101  |  11.5  ----------------------------<  110<H>  3.9   |  24.0  |  0.65    Ca    9.5      24 Apr 2024 23:20    TPro  7.3  /  Alb  4.2  /  TBili  0.3<L>  /  DBili  x   /  AST  22  /  ALT  20  /  AlkPhos  95  04-24    LIVER FUNCTIONS - ( 24 Apr 2024 23:20 )  Alb: 4.2 g/dL / Pro: 7.3 g/dL / ALK PHOS: 95 U/L / ALT: 20 U/L / AST: 22 U/L / GGT: x             Lipase: 59 U/L (04-24-24 @ 23:20)        Urinalysis Basic - ( 24 Apr 2024 23:20 )    Color: x / Appearance: x / SG: x / pH: x  Gluc: 110 mg/dL / Ketone: x  / Bili: x / Urobili: x   Blood: x / Protein: x / Nitrite: x   Leuk Esterase: x / RBC: x / WBC x   Sq Epi: x / Non Sq Epi: x / Bacteria: x    < from: US Abdomen Upper Quadrant Right (04.25.24 @ 07:12) >  FINDINGS:  Liver: Increased echogenicity.  Bile ducts: Common bile duct measures 12 mm.  Gallbladder: Cholecystectomy.  Pancreas: Visualized portions are within normal limits.  Right kidney: 10.8 cm. No hydronephrosis. Renal cortical thinning.  Ascites: None.  IVC: Visualized portions are within normal limits.    IMPRESSION:  Hepatic steatosis.  Status post cholecystectomy.  CBD dilatation, similar to CT.    < end of copied text >    < from: CT Abdomen and Pelvis w/ IV Cont (04.25.24 @ 02:29) >  FINDINGS:  LOWER CHEST: Mild bibasilar dependent atelectasis.    LIVER: Hepatic steatosis.  BILE DUCTS: Mild intrahepatic and extra hepatic biliary ductal dilatation   with common bile duct measuring up to 1.4 cm with distal tapering. No   discrete choledocholith.  GALLBLADDER: Surgically absent.  SPLEEN: Within normal limits.  PANCREAS: Within normal limits.  ADRENALS: Within normal limits.  KIDNEYS/URETERS: Kidneys enhance symmetrically without hydronephrosis or   focal renal parenchymal lesion.    BLADDER: Underdistended.  REPRODUCTIVE ORGANS: Hysterectomy. Adnexa are unremarkable.    BOWEL: No bowel obstruction or overt bowel wall thickening. Appendix is   normal.  PERITONEUM: No ascites, pneumoperitoneum, or loculated collection. No   mesenteric lymphadenopathy.  VESSELS: Atherosclerotic calcifications of the aortoiliac tree. Normal   caliber abdominal aorta.  RETROPERITONEUM/LYMPH NODES: No lymphadenopathy.  ABDOMINAL WALL: Within normal limits.  BONES: Mild degenerative changes of the spine.    IMPRESSION:  Postcholecystectomy. Mild intrahepatic and extra hepatic biliary ductal   dilatation with common bile duct measuring up to 1.4 cm. No discrete   choledocholith. Correlate with LFTs. Sonogram orMRI/MRCP may be   considered, as clinically indicated.    < end of copied text >

## 2024-04-25 NOTE — ED PROVIDER NOTE - CLINICAL SUMMARY MEDICAL DECISION MAKING FREE TEXT BOX
64-year-old female with no past medical history, status post cholecystectomy presenting with right upper quadrant pain, severe, associated nausea vomiting.  Labs were obtained, showed slightly elevated lipase, otherwise were nonactionable.  CT of her abdomen pelvis was obtained, which showed biliary ductal  dilatation as well as 1.4 cm common bile duct.  MRCP ordered, GI consulted, patient admitted to medicine for further management for suspected retained stone.

## 2024-04-25 NOTE — H&P ADULT - HISTORY OF PRESENT ILLNESS
62 yo F with hx of cholelithiasis s/p cholecystectomy 26 years ago and hyperlipidemia presents to the ED for right upper quadrant abdominal abdominal pain. Pain started 3 weeks ago but was bearable. She denies diarrhea but notes constipation, Has not been eaitng much due to nausea. Over the past few days took Ibuprofen and Tylenol for worsening pain. No fevers or chills. Yesterday pain worsened so she came to the ER, She states her pain is similar to the pain she had when she had gallstone and had to have her gallbladder removed.   In the ED CT scan noted CBD dilation. GI was consulted. LFTs and bilirubin within normal limit. Mildly elevated lipase,

## 2024-04-25 NOTE — CONSULT NOTE ADULT - NS ATTEND AMEND GEN_ALL_CORE FT
I evaluated this pt. with my ACP and agree with the above assessment and management plan. Pt with RUQ pain and dilated CBD s/p lap etelvina over twenty years ago. Abdominal sonogram and CT scan both show dilated CBD. Liver chemistries are normal. MRCP ordered to r/o choledocholithiasis. OK for CLD today. Pantoprazole for GI mucosal cytoprotection. Await MRCP results. Repeat labs ordered for the AM. I reviewed her CT and RUQ US images and agree with the Radiologist's interpretations.

## 2024-04-26 LAB
ALBUMIN SERPL ELPH-MCNC: 3.4 G/DL — SIGNIFICANT CHANGE UP (ref 3.3–5.2)
ALP SERPL-CCNC: 63 U/L — SIGNIFICANT CHANGE UP (ref 40–120)
ALT FLD-CCNC: 14 U/L — SIGNIFICANT CHANGE UP
ANION GAP SERPL CALC-SCNC: 12 MMOL/L — SIGNIFICANT CHANGE UP (ref 5–17)
AST SERPL-CCNC: 13 U/L — SIGNIFICANT CHANGE UP
BASOPHILS # BLD AUTO: 0.01 K/UL — SIGNIFICANT CHANGE UP (ref 0–0.2)
BASOPHILS NFR BLD AUTO: 0.2 % — SIGNIFICANT CHANGE UP (ref 0–2)
BILIRUB SERPL-MCNC: 0.3 MG/DL — LOW (ref 0.4–2)
BUN SERPL-MCNC: 5.2 MG/DL — LOW (ref 8–20)
CALCIUM SERPL-MCNC: 8.4 MG/DL — SIGNIFICANT CHANGE UP (ref 8.4–10.5)
CHLORIDE SERPL-SCNC: 108 MMOL/L — SIGNIFICANT CHANGE UP (ref 96–108)
CO2 SERPL-SCNC: 24 MMOL/L — SIGNIFICANT CHANGE UP (ref 22–29)
CREAT SERPL-MCNC: 0.56 MG/DL — SIGNIFICANT CHANGE UP (ref 0.5–1.3)
EGFR: 103 ML/MIN/1.73M2 — SIGNIFICANT CHANGE UP
EOSINOPHIL # BLD AUTO: 0.19 K/UL — SIGNIFICANT CHANGE UP (ref 0–0.5)
EOSINOPHIL NFR BLD AUTO: 3.6 % — SIGNIFICANT CHANGE UP (ref 0–6)
GLUCOSE SERPL-MCNC: 94 MG/DL — SIGNIFICANT CHANGE UP (ref 70–99)
HCT VFR BLD CALC: 41.3 % — SIGNIFICANT CHANGE UP (ref 34.5–45)
HGB BLD-MCNC: 13.6 G/DL — SIGNIFICANT CHANGE UP (ref 11.5–15.5)
IMM GRANULOCYTES NFR BLD AUTO: 0.2 % — SIGNIFICANT CHANGE UP (ref 0–0.9)
LYMPHOCYTES # BLD AUTO: 2.66 K/UL — SIGNIFICANT CHANGE UP (ref 1–3.3)
LYMPHOCYTES # BLD AUTO: 50.7 % — HIGH (ref 13–44)
MAGNESIUM SERPL-MCNC: 1.8 MG/DL — SIGNIFICANT CHANGE UP (ref 1.8–2.6)
MCHC RBC-ENTMCNC: 29.6 PG — SIGNIFICANT CHANGE UP (ref 27–34)
MCHC RBC-ENTMCNC: 32.9 GM/DL — SIGNIFICANT CHANGE UP (ref 32–36)
MCV RBC AUTO: 90 FL — SIGNIFICANT CHANGE UP (ref 80–100)
MONOCYTES # BLD AUTO: 0.44 K/UL — SIGNIFICANT CHANGE UP (ref 0–0.9)
MONOCYTES NFR BLD AUTO: 8.4 % — SIGNIFICANT CHANGE UP (ref 2–14)
NEUTROPHILS # BLD AUTO: 1.94 K/UL — SIGNIFICANT CHANGE UP (ref 1.8–7.4)
NEUTROPHILS NFR BLD AUTO: 36.9 % — LOW (ref 43–77)
PLATELET # BLD AUTO: 241 K/UL — SIGNIFICANT CHANGE UP (ref 150–400)
POTASSIUM SERPL-MCNC: 3.9 MMOL/L — SIGNIFICANT CHANGE UP (ref 3.5–5.3)
POTASSIUM SERPL-SCNC: 3.9 MMOL/L — SIGNIFICANT CHANGE UP (ref 3.5–5.3)
PROT SERPL-MCNC: 5.9 G/DL — LOW (ref 6.6–8.7)
RBC # BLD: 4.59 M/UL — SIGNIFICANT CHANGE UP (ref 3.8–5.2)
RBC # FLD: 12.9 % — SIGNIFICANT CHANGE UP (ref 10.3–14.5)
SODIUM SERPL-SCNC: 143 MMOL/L — SIGNIFICANT CHANGE UP (ref 135–145)
WBC # BLD: 5.25 K/UL — SIGNIFICANT CHANGE UP (ref 3.8–10.5)
WBC # FLD AUTO: 5.25 K/UL — SIGNIFICANT CHANGE UP (ref 3.8–10.5)

## 2024-04-26 PROCEDURE — 99232 SBSQ HOSP IP/OBS MODERATE 35: CPT

## 2024-04-26 PROCEDURE — 99231 SBSQ HOSP IP/OBS SF/LOW 25: CPT

## 2024-04-26 RX ORDER — SENNA PLUS 8.6 MG/1
2 TABLET ORAL AT BEDTIME
Refills: 0 | Status: DISCONTINUED | OUTPATIENT
Start: 2024-04-26 | End: 2024-04-28

## 2024-04-26 RX ORDER — POLYETHYLENE GLYCOL 3350 17 G/17G
17 POWDER, FOR SOLUTION ORAL DAILY
Refills: 0 | Status: DISCONTINUED | OUTPATIENT
Start: 2024-04-26 | End: 2024-04-28

## 2024-04-26 RX ORDER — PANTOPRAZOLE SODIUM 20 MG/1
40 TABLET, DELAYED RELEASE ORAL DAILY
Refills: 0 | Status: DISCONTINUED | OUTPATIENT
Start: 2024-04-26 | End: 2024-04-28

## 2024-04-26 RX ADMIN — SODIUM CHLORIDE 100 MILLILITER(S): 9 INJECTION INTRAMUSCULAR; INTRAVENOUS; SUBCUTANEOUS at 20:02

## 2024-04-26 RX ADMIN — MORPHINE SULFATE 2 MILLIGRAM(S): 50 CAPSULE, EXTENDED RELEASE ORAL at 06:56

## 2024-04-26 RX ADMIN — SODIUM CHLORIDE 100 MILLILITER(S): 9 INJECTION INTRAMUSCULAR; INTRAVENOUS; SUBCUTANEOUS at 12:42

## 2024-04-26 RX ADMIN — SENNA PLUS 2 TABLET(S): 8.6 TABLET ORAL at 21:21

## 2024-04-26 RX ADMIN — SODIUM CHLORIDE 100 MILLILITER(S): 9 INJECTION INTRAMUSCULAR; INTRAVENOUS; SUBCUTANEOUS at 06:25

## 2024-04-26 RX ADMIN — Medication 650 MILLIGRAM(S): at 21:00

## 2024-04-26 RX ADMIN — Medication 15 MILLIGRAM(S): at 06:25

## 2024-04-26 RX ADMIN — PANTOPRAZOLE SODIUM 40 MILLIGRAM(S): 20 TABLET, DELAYED RELEASE ORAL at 12:13

## 2024-04-26 RX ADMIN — POLYETHYLENE GLYCOL 3350 17 GRAM(S): 17 POWDER, FOR SOLUTION ORAL at 12:12

## 2024-04-26 RX ADMIN — Medication 10 MILLIGRAM(S): at 14:38

## 2024-04-26 RX ADMIN — Medication 650 MILLIGRAM(S): at 20:02

## 2024-04-26 RX ADMIN — ATORVASTATIN CALCIUM 40 MILLIGRAM(S): 80 TABLET, FILM COATED ORAL at 21:21

## 2024-04-26 RX ADMIN — ENOXAPARIN SODIUM 40 MILLIGRAM(S): 100 INJECTION SUBCUTANEOUS at 15:28

## 2024-04-26 NOTE — PROGRESS NOTE ADULT - SUBJECTIVE AND OBJECTIVE BOX
Right upper quadrant abdominal pain    HPI:  64 yo F with hx of cholelithiasis s/p cholecystectomy 26 years ago and hyperlipidemia presents to the ED for right upper quadrant abdominal abdominal pain. Pain started 3 weeks ago but was bearable. She denies diarrhea but notes constipation, Has not been eaitng much due to nausea. Over the past few days took Ibuprofen and Tylenol for worsening pain. No fevers or chills. Yesterday pain worsened so she came to the ER, She states her pain is similar to the pain she had when she had gallstone and had to have her gallbladder removed.   In the ED CT scan noted CBD dilation. GI was consulted. LFTs and bilirubin within normal limit. Mildly elevated lipase, (25 Apr 2024 09:04)    Interval History:  Patient was seen and examined at bedside around 9:30 am with  - Leann.   Still complaining of a lot of RUQ abdominal pain.   Denies nausea or vomiting but no BM for 2-3 days.     ROS:  As per interval history otherwise unremarkable.    PHYSICAL EXAM:  Vital Signs   T(C): 36.5 (26 Apr 2024 10:30), Max: 36.6 (25 Apr 2024 15:45)  T(F): 97.7 (26 Apr 2024 10:30), Max: 97.8 (25 Apr 2024 15:45)  HR: 55 (26 Apr 2024 10:30) (53 - 83)  BP: 145/79 (26 Apr 2024 10:30) (119/78 - 154/77)  RR: 12 (26 Apr 2024 10:30) (12 - 18)  SpO2: 93% (26 Apr 2024 10:30) (93% - 98%)  Parameters below as of 26 Apr 2024 10:30  Patient On (Oxygen Delivery Method): room air  General: Elderly female lying in bed comfortably. No acute distress  HEENT: EOMI. Clear conjunctivae. Moist mucus membrane  Neck: Supple.   Chest: Good air entry. No wheezing, rales or rhonchi.   Heart: Normal S1 & S2. RRR.   Abdomen: Non distended. Soft. Tender in RUQ. + BS  Ext: No pedal edema. No calf tenderness   Neuro: Awake and alert. No focal deficit. Speech clear.   Skin: Warm and Dry  Psychiatry: Normal mood and affect    I&O's Summary    25 Apr 2024 07:01 - 26 Apr 2024 07:00  --------------------------------------------------------  IN: 0 mL / OUT: 350 mL / NET: -350 mL    LABS:                        13.6   5.25  )-----------( 241      ( 26 Apr 2024 05:48 )             41.3     04-26    143  |  108  |  5.2<L>  ----------------------------<  94  3.9   |  24.0  |  0.56    Ca    8.4      26 Apr 2024 05:48  Mg     1.8     04-26    TPro  5.9<L>  /  Alb  3.4  /  TBili  0.3<L>  /  DBili  x   /  AST  13  /  ALT  14  /  AlkPhos  63  04-26    PT/INR - ( 25 Apr 2024 10:50 )   PT: 12.1 sec;   INR: 1.09 ratio      Urinalysis Basic - ( 26 Apr 2024 05:48 )    Color: x / Appearance: x / SG: x / pH: x  Gluc: 94 mg/dL / Ketone: x  / Bili: x / Urobili: x   Blood: x / Protein: x / Nitrite: x   Leuk Esterase: x / RBC: x / WBC x   Sq Epi: x / Non Sq Epi: x / Bacteria: x    RADIOLOGY & ADDITIONAL STUDIES:  Reviewed     MEDICATIONS  (STANDING):  atorvastatin 40 milliGRAM(s) Oral at bedtime  enoxaparin Injectable 40 milliGRAM(s) SubCutaneous every 24 hours  pantoprazole    Tablet 40 milliGRAM(s) Oral daily  polyethylene glycol 3350 17 Gram(s) Oral daily  sodium chloride 0.9%. 1000 milliLiter(s) (100 mL/Hr) IV Continuous <Continuous>    MEDICATIONS  (PRN):  acetaminophen     Tablet .. 650 milliGRAM(s) Oral every 6 hours PRN Temp greater or equal to 38C (100.4F), Mild Pain (1 - 3)  aluminum hydroxide/magnesium hydroxide/simethicone Suspension 30 milliLiter(s) Oral every 4 hours PRN Dyspepsia  ketorolac   Injectable 15 milliGRAM(s) IV Push every 6 hours PRN Moderate Pain (4 - 6)  melatonin 3 milliGRAM(s) Oral at bedtime PRN Insomnia  morphine  - Injectable 2 milliGRAM(s) IV Push every 4 hours PRN Severe Pain (7 - 10)  ondansetron Injectable 4 milliGRAM(s) IV Push every 8 hours PRN Nausea and/or Vomiting       Right upper quadrant abdominal pain    HPI:  62 yo F with hx of cholelithiasis s/p cholecystectomy 26 years ago and hyperlipidemia presents to the ED for right upper quadrant abdominal abdominal pain. Pain started 3 weeks ago but was bearable. She denies diarrhea but notes constipation, Has not been eaitng much due to nausea. Over the past few days took Ibuprofen and Tylenol for worsening pain. No fevers or chills. Yesterday pain worsened so she came to the ER, She states her pain is similar to the pain she had when she had gallstone and had to have her gallbladder removed.   In the ED CT scan noted CBD dilation. GI was consulted. LFTs and bilirubin within normal limit. Mildly elevated lipase, (25 Apr 2024 09:04)    Interval History:  Patient was seen and examined at bedside around 9:30 am with  - Leann.   Still complaining of a lot of RUQ abdominal pain.   Denies nausea or vomiting but no BM for 2-3 days.     ROS:  As per interval history otherwise unremarkable.    PHYSICAL EXAM:  Vital Signs   T(C): 36.5 (26 Apr 2024 10:30), Max: 36.6 (25 Apr 2024 15:45)  T(F): 97.7 (26 Apr 2024 10:30), Max: 97.8 (25 Apr 2024 15:45)  HR: 55 (26 Apr 2024 10:30) (53 - 83)  BP: 145/79 (26 Apr 2024 10:30) (119/78 - 154/77)  RR: 12 (26 Apr 2024 10:30) (12 - 18)  SpO2: 93% (26 Apr 2024 10:30) (93% - 98%)  Parameters below as of 26 Apr 2024 10:30  Patient On (Oxygen Delivery Method): room air  General: Elderly female lying in bed comfortably. No acute distress  HEENT: EOMI. Clear conjunctivae. Moist mucus membrane  Neck: Supple.   Chest: Good air entry. No wheezing, rales or rhonchi.   Heart: Normal S1 & S2. RRR.   Abdomen: Non distended. Soft. Tender in RUQ. + BS  Ext: No pedal edema. No calf tenderness   Neuro: Awake and alert. No focal deficit. Speech clear.   Skin: Warm and Dry  Psychiatry: Normal mood and affect    I&O's Summary    25 Apr 2024 07:01 - 26 Apr 2024 07:00  --------------------------------------------------------  IN: 0 mL / OUT: 350 mL / NET: -350 mL    LABS:                        13.6   5.25  )-----------( 241      ( 26 Apr 2024 05:48 )             41.3     04-26    143  |  108  |  5.2<L>  ----------------------------<  94  3.9   |  24.0  |  0.56    Ca    8.4      26 Apr 2024 05:48  Mg     1.8     04-26    TPro  5.9<L>  /  Alb  3.4  /  TBili  0.3<L>  /  DBili  x   /  AST  13  /  ALT  14  /  AlkPhos  63  04-26    PT/INR - ( 25 Apr 2024 10:50 )   PT: 12.1 sec;   INR: 1.09 ratio      Urinalysis Basic - ( 26 Apr 2024 05:48 )    Color: x / Appearance: x / SG: x / pH: x  Gluc: 94 mg/dL / Ketone: x  / Bili: x / Urobili: x   Blood: x / Protein: x / Nitrite: x   Leuk Esterase: x / RBC: x / WBC x   Sq Epi: x / Non Sq Epi: x / Bacteria: x    RADIOLOGY & ADDITIONAL STUDIES:  Reviewed     MEDICATIONS  (STANDING):  atorvastatin 40 milliGRAM(s) Oral at bedtime  enoxaparin Injectable 40 milliGRAM(s) SubCutaneous every 24 hours  pantoprazole    Tablet 40 milliGRAM(s) Oral daily  polyethylene glycol 3350 17 Gram(s) Oral daily  senna 2 Tablet(s) Oral at bedtime  sodium chloride 0.9%. 1000 milliLiter(s) (100 mL/Hr) IV Continuous <Continuous>    MEDICATIONS  (PRN):  acetaminophen     Tablet .. 650 milliGRAM(s) Oral every 6 hours PRN Temp greater or equal to 38C (100.4F), Mild Pain (1 - 3)  aluminum hydroxide/magnesium hydroxide/simethicone Suspension 30 milliLiter(s) Oral every 4 hours PRN Dyspepsia  dicyclomine Injectable 10 milliGRAM(s) IntraMuscular every 8 hours PRN Abdominal Pain  melatonin 3 milliGRAM(s) Oral at bedtime PRN Insomnia  morphine  - Injectable 2 milliGRAM(s) IV Push every 4 hours PRN Severe Pain (7 - 10)  ondansetron Injectable 4 milliGRAM(s) IV Push every 8 hours PRN Nausea and/or Vomiting

## 2024-04-26 NOTE — PROGRESS NOTE ADULT - ASSESSMENT
63 y/o F with PMHx HLD, PSH of cholecystectomy (26 years ago) presented to ED with epigastric and RUQ pain x 3 weeks with associated nausea and vomiting     #Abdominal pain, nausea, vomiting  #Dilated CBD s/p cholecystectomy   MR MRCP w/wo IV Cont (04.25.24)- Nonspecific dilatation of the biliary tree, CBD 14 mm.Tapered appearance of the distal duct without filling defect. Cholecystectomy. No MR visible common bile duct stone.  CT A/P w/ IV Cont (04.25.24)- Hepatic steatosis. Mild intrahepatic and extra hepatic biliary ductal dilatation with CBD measuring up to 1.4 cm with distal tapering. No discrete choledocholith.  US Abd (04.25.24)- Hepatic steatosis. Status post cholecystectomy. Common bile duct measures 12 mm.    - Continue to trend CBC, CMP, coags  - Monitor abdominal pain  - Add Miralax for constipation   - Clear liquid diet, advance as tolerated   - Further care per primary team    #Hepatic steatosis   CT A/P w/ IV Cont (04.25.24)- Hepatic steatosis      - Trend LFTs  - Maintain normal BMI  - Alcohol abstinence  - Low fat diet  - Follow up with hepatologist Dr. Quintanilla for further assessment and monitoring   _________________________________________________________________  Assessment and recommendations are final when note is signed by the attending physician.

## 2024-04-26 NOTE — PROGRESS NOTE ADULT - ASSESSMENT
62 yo F with hx of cholelithiasis s/p cholecystectomy 26 years ago and hyperlipidemia presents to the ED for right upper quadrant abdominal abdominal pain.    RUQ pain, r/o Choledocholithiasis   CBD dilation on CT and US  LFTs WNL  lipase 59   MRCP with no choledocholithiasis. Nonspecific biliary ductal dilatation likely related to postcholecystectomy state.   - IVFs  - CLD, advance as tolerated   - Pain control tylenol, toradol and morphine  - Antiemetic a needed   - Add Miralax for constipation  - Add Protonix for reflux   - GI following     Hepatic Steatosis  - Lifestyle modification  - Outpatient follow up with Dr. Socorro THOMAS  - resumed statin    Elevated BP without diagnosis of HTN  Likely due to pain  - Monitor    DVT ppx: Lovenox SQ    Dispo: Home likely in 24-48 hours.  62 yo F with hx of cholelithiasis s/p cholecystectomy 26 years ago, gastroesophageal reflux disease and hyperlipidemia presents to the ED for right upper quadrant abdominal abdominal pain.    RUQ pain, r/o Choledocholithiasis   CBD dilation on CT and US  LFTs WNL  lipase 59   MRCP with no choledocholithiasis. Nonspecific biliary ductal dilatation likely related to postcholecystectomy state.   - IVFs  - CLD, advance as tolerated   - Pain control tylenol, morphine and bentyl   - Antiemetic as needed   - Add Miralax for constipation  - GI following     GERD  - Resume Protonix     Hepatic Steatosis  - Lifestyle modification  - Outpatient follow up with Dr. Socorro THOMAS  - resumed statin    Elevated BP without diagnosis of HTN  Likely due to pain  - Monitor    DVT ppx: Lovenox SQ    Dispo: Home likely in 24-48 hours.     Updated patient's daughter at bedside.

## 2024-04-26 NOTE — PROGRESS NOTE ADULT - SUBJECTIVE AND OBJECTIVE BOX
Chief Complaint:  Patient is a 63y old  Female who presents with a chief complaint of Abdominal pain (25 Apr 2024 09:04)      HPI/ 24 hr events: Patient seen and examined at bedside. She is complaining of persistent RUQ abdominal discomfort. She is tolerating clear liquid diet. She has not had a BM. Denies nausea, vomiting, diarrhea. Vitals are overall stable, afebrile, no leukocytosis, liver tests are not elevated. MRCP non-specific biliary ductal dilatation likely related to postcholecystectomy state, no visible CBD stone. Language Line #[id number]     REVIEW OF SYSTEMS:   General: Negative  HEENT: Negative  CV: Negative  Respiratory: Negative  GI: See HPI  : Negative  MSK: Negative  Hematologic: Negative  Skin: Negative    MEDICATIONS:   MEDICATIONS  (STANDING):  atorvastatin 40 milliGRAM(s) Oral at bedtime  enoxaparin Injectable 40 milliGRAM(s) SubCutaneous every 24 hours  ertapenem  IVPB 1000 milliGRAM(s) IV Intermittent once  indomethacin Suppository 100 milliGRAM(s) Rectal once  sodium chloride 0.9%. 1000 milliLiter(s) (100 mL/Hr) IV Continuous <Continuous>    MEDICATIONS  (PRN):  acetaminophen     Tablet .. 650 milliGRAM(s) Oral every 6 hours PRN Temp greater or equal to 38C (100.4F), Mild Pain (1 - 3)  aluminum hydroxide/magnesium hydroxide/simethicone Suspension 30 milliLiter(s) Oral every 4 hours PRN Dyspepsia  ketorolac   Injectable 15 milliGRAM(s) IV Push every 6 hours PRN Moderate Pain (4 - 6)  melatonin 3 milliGRAM(s) Oral at bedtime PRN Insomnia  morphine  - Injectable 2 milliGRAM(s) IV Push every 4 hours PRN Severe Pain (7 - 10)  ondansetron Injectable 4 milliGRAM(s) IV Push every 8 hours PRN Nausea and/or Vomiting            DIET:  Diet, NPO after Midnight:      NPO Start Date: 25-Apr-2024,   NPO Start Time: 23:59  Except Medications (04-25-24 @ 09:53) [Active]  Diet, Clear Liquid (04-25-24 @ 09:03) [Active]          ALLERGIES:   Allergies    No Known Allergies    Intolerances        VITAL SIGNS:   Vital Signs Last 24 Hrs  T(C): 36.6 (26 Apr 2024 04:51), Max: 36.6 (25 Apr 2024 15:45)  T(F): 97.8 (26 Apr 2024 04:51), Max: 97.8 (25 Apr 2024 15:45)  HR: 83 (26 Apr 2024 04:51) (53 - 83)  BP: 152/80 (26 Apr 2024 04:51) (119/78 - 154/77)  BP(mean): --  RR: 18 (26 Apr 2024 04:51) (18 - 18)  SpO2: 98% (26 Apr 2024 04:51) (97% - 98%)    Parameters below as of 26 Apr 2024 04:51  Patient On (Oxygen Delivery Method): room air      I&O's Summary    25 Apr 2024 07:01  -  26 Apr 2024 07:00  --------------------------------------------------------  IN: 0 mL / OUT: 350 mL / NET: -350 mL        PHYSICAL EXAM:   GENERAL:  No acute distress  HEENT:  NC/AT, conjunctiva clear, sclera anicteric  CHEST:  No increased effort  HEART:  Regular rate  ABDOMEN:  Soft, right sided abdominal tenderness, non-distended, normoactive bowel sounds, no rebound or guarding  EXTREMITIES: No edema  SKIN:  Warm, dry  NEURO:  Calm, cooperative    LABS:                        13.6   5.25  )-----------( 241      ( 26 Apr 2024 05:48 )             41.3     Hemoglobin: 13.6 g/dL (04-26-24 @ 05:48)  Hemoglobin: 15.1 g/dL (04-24-24 @ 23:20)    04-26    143  |  108  |  5.2<L>  ----------------------------<  94  3.9   |  24.0  |  0.56    Ca    8.4      26 Apr 2024 05:48  Mg     1.8     04-26    TPro  5.9<L>  /  Alb  3.4  /  TBili  0.3<L>  /  DBili  x   /  AST  13  /  ALT  14  /  AlkPhos  63  04-26    LIVER FUNCTIONS - ( 26 Apr 2024 05:48 )  Alb: 3.4 g/dL / Pro: 5.9 g/dL / ALK PHOS: 63 U/L / ALT: 14 U/L / AST: 13 U/L / GGT: x             PT/INR - ( 25 Apr 2024 10:50 )   PT: 12.1 sec;   INR: 1.09 ratio                                                 RADIOLOGY & ADDITIONAL STUDIES:      ACC: [id number] EXAM:  MR MRCP WAW IC   ORDERED BY: BILLIE ROJAS     PROCEDURE DATE:  04/25/2024          INTERPRETATION:  CLINICAL INFORMATION: Right upper quadrant pain    COMPARISON: CT of the abdomen and pelvis dated 04/25/2024    CONTRAST/COMPLICATIONS:  IV Contrast: Gadavist  5 cc administered   2.5 cc discarded  Oral Contrast: NONE  Complications: None reported at time of study completion    PROCEDURE:  MRI/MRCP was performed. Radial and 3D MRCP sequences were obtained.      FINDINGS:  Multiple sequences degraded by motion artifact.    LOWER CHEST: Within normal limits.    LIVER: Geographic regions of steatosis in the right hepatic lobe.  BILE DUCTS: Nonspecific dilatation of the biliary tree, common bile duct   measuring 14 mm. Findings within normal limits for a patient who is   undergone prior cholecystectomy. Tapered appearance of the distal duct   without filling defect.  GALLBLADDER: Cholecystectomy.  SPLEEN: Within normal limits.  PANCREAS: Within normal limits.  ADRENALS: Within normal limits.  KIDNEYS/URETERS: Within normal limits.    VISUALIZED PORTIONS:  BOWEL: Within normal limits.  PERITONEUM: No ascites.  VESSELS: Within normal limits.  RETROPERITONEUM/LYMPH NODES: No lymphadenopathy.  ABDOMINAL WALL: Within normal limits.  BONES: Within normal limits.    IMPRESSION:  Nonspecific biliary ductal dilatation, likely related to   postcholecystectomy state.  No MR visible common bile duct stone.        --- End of Report ---            FABRIZIO POWELL; Attending Radiologist  This document has been electronically signed. Apr 26 2024  9:29AM  04-25-24 @ 21:10            ACC: [id number] EXAM:  CT ABDOMEN AND PELVIS IC   ORDERED BY: BILLIE ROJAS     PROCEDURE DATE:  04/25/2024          INTERPRETATION:  CLINICAL INFORMATION: Right upper quadrant pain, status   post cholecystectomy.    COMPARISON: 7/15/2022.    CONTRAST/COMPLICATIONS:  IV Contrast: Omnipaque 350  90 cc administered   10 cc discarded  Oral Contrast: NONE  Complications: None reported at time of study completion    PROCEDURE:  CT of the Abdomen and Pelvis was performed.  Sagittal and coronal reformats were performed.    FINDINGS:  LOWER CHEST: Mild bibasilar dependent atelectasis.    LIVER: Hepatic steatosis.  BILE DUCTS: Mild intrahepatic and extra hepatic biliary ductal dilatation   with common bile duct measuring up to 1.4 cm with distal tapering. No   discrete choledocholith.  GALLBLADDER: Surgically absent.  SPLEEN: Within normal limits.  PANCREAS: Within normal limits.  ADRENALS: Within normal limits.  KIDNEYS/URETERS: Kidneys enhance symmetrically without hydronephrosis or   focal renal parenchymal lesion.    BLADDER: Underdistended.  REPRODUCTIVE ORGANS: Hysterectomy. Adnexa are unremarkable.    BOWEL: No bowel obstruction or overt bowel wall thickening. Appendix is   normal.  PERITONEUM: No ascites, pneumoperitoneum, or loculated collection. No   mesenteric lymphadenopathy.  VESSELS: Atherosclerotic calcifications of the aortoiliac tree. Normal   caliber abdominal aorta.  RETROPERITONEUM/LYMPH NODES: No lymphadenopathy.  ABDOMINAL WALL: Within normal limits.  BONES: Mild degenerative changes of the spine.    IMPRESSION:  Postcholecystectomy. Mild intrahepatic and extra hepatic biliary ductal   dilatation with common bile duct measuring up to 1.4 cm. No discrete   choledocholith. Correlate with LFTs. Sonogram orMRI/MRCP may be   considered, as clinically indicated.        --- End of Report ---            SINA JOSE DO; Attending Radiologist  This document has been electronically signed. Apr 25 2024  3:15AM  04-25-24 @ 02:29    -- -- --       ACC: [id number] EXAM:  US ABDOMEN RT UPR QUADRANT   ORDERED BY: BILLIE ROJAS     PROCEDURE DATE:  04/25/2024          INTERPRETATION:  CLINICAL INFORMATION: Right upper quadrant abdominal pain    COMPARISON: CT dated 4/25/2024 and 7/15/2022    TECHNIQUE: Sonography of the right upper quadrant.    FINDINGS:  Liver: Increased echogenicity.  Bile ducts: Common bile duct measures 12 mm.  Gallbladder: Cholecystectomy.  Pancreas: Visualized portions are within normal limits.  Right kidney: 10.8 cm. No hydronephrosis. Renal cortical thinning.  Ascites: None.  IVC: Visualized portions are within normal limits.    IMPRESSION:  Hepatic steatosis.  Status post cholecystectomy.  CBD dilatation, similar to CT.    --- End of Report ---            MEAGHAN LEVINE MD; Attending Radiologist  This document has been electronically signed. Apr 25 2024  7:31AM

## 2024-04-27 LAB
ALBUMIN SERPL ELPH-MCNC: 3.4 G/DL — SIGNIFICANT CHANGE UP (ref 3.3–5.2)
ALP SERPL-CCNC: 67 U/L — SIGNIFICANT CHANGE UP (ref 40–120)
ALT FLD-CCNC: 15 U/L — SIGNIFICANT CHANGE UP
ANION GAP SERPL CALC-SCNC: 11 MMOL/L — SIGNIFICANT CHANGE UP (ref 5–17)
AST SERPL-CCNC: 16 U/L — SIGNIFICANT CHANGE UP
BILIRUB SERPL-MCNC: 0.4 MG/DL — SIGNIFICANT CHANGE UP (ref 0.4–2)
BUN SERPL-MCNC: 4.7 MG/DL — LOW (ref 8–20)
CALCIUM SERPL-MCNC: 8.5 MG/DL — SIGNIFICANT CHANGE UP (ref 8.4–10.5)
CHLORIDE SERPL-SCNC: 107 MMOL/L — SIGNIFICANT CHANGE UP (ref 96–108)
CO2 SERPL-SCNC: 22 MMOL/L — SIGNIFICANT CHANGE UP (ref 22–29)
CREAT SERPL-MCNC: 0.49 MG/DL — LOW (ref 0.5–1.3)
EGFR: 106 ML/MIN/1.73M2 — SIGNIFICANT CHANGE UP
GLUCOSE SERPL-MCNC: 91 MG/DL — SIGNIFICANT CHANGE UP (ref 70–99)
MAGNESIUM SERPL-MCNC: 1.6 MG/DL — SIGNIFICANT CHANGE UP (ref 1.6–2.6)
POTASSIUM SERPL-MCNC: 3.7 MMOL/L — SIGNIFICANT CHANGE UP (ref 3.5–5.3)
POTASSIUM SERPL-SCNC: 3.7 MMOL/L — SIGNIFICANT CHANGE UP (ref 3.5–5.3)
PROT SERPL-MCNC: 5.8 G/DL — LOW (ref 6.6–8.7)
SODIUM SERPL-SCNC: 140 MMOL/L — SIGNIFICANT CHANGE UP (ref 135–145)

## 2024-04-27 PROCEDURE — 99231 SBSQ HOSP IP/OBS SF/LOW 25: CPT

## 2024-04-27 PROCEDURE — 99232 SBSQ HOSP IP/OBS MODERATE 35: CPT

## 2024-04-27 RX ADMIN — Medication 650 MILLIGRAM(S): at 07:42

## 2024-04-27 RX ADMIN — ENOXAPARIN SODIUM 40 MILLIGRAM(S): 100 INJECTION SUBCUTANEOUS at 15:51

## 2024-04-27 RX ADMIN — ATORVASTATIN CALCIUM 40 MILLIGRAM(S): 80 TABLET, FILM COATED ORAL at 21:35

## 2024-04-27 RX ADMIN — POLYETHYLENE GLYCOL 3350 17 GRAM(S): 17 POWDER, FOR SOLUTION ORAL at 11:13

## 2024-04-27 RX ADMIN — PANTOPRAZOLE SODIUM 40 MILLIGRAM(S): 20 TABLET, DELAYED RELEASE ORAL at 11:12

## 2024-04-27 RX ADMIN — Medication 650 MILLIGRAM(S): at 06:19

## 2024-04-27 NOTE — PROGRESS NOTE ADULT - NS ATTEND AMEND GEN_ALL_CORE FT
The patient says she has minimal abdominal pain and is able to eat well.  She says the pain gets better every time she moves her bowels.  No further GI testing is indicated.  She should go home on a bowel regimen.  GI will sign off.  Please call us for any further inpatient questions.
Lose a complaint of right upper quadrant abdominal pain that is persisting today.  I think we can definitively say she does not have pain from choledocholithiasis.  Looking at her CT scan this certainly could be pain secondary to constipation and I agree with the attempt at laxatives and a cleanout.  Additionally I think it would be prudent to begin a PPI and see how she does.  I think we can advance her diet as tolerated.

## 2024-04-27 NOTE — PROGRESS NOTE ADULT - ASSESSMENT
63 y/o F with PMHx HLD, PSH of cholecystectomy (26 years ago) presented to ED with epigastric and RUQ pain x 3 weeks with associated nausea and vomiting     #Abdominal pain, nausea, vomiting  #Dilated CBD s/p cholecystectomy   MR MRCP w/wo IV Cont (04.25.24)- Nonspecific dilatation of the biliary tree, CBD 14 mm.Tapered appearance of the distal duct without filling defect. Cholecystectomy. No MR visible common bile duct stone.  CT A/P w/ IV Cont (04.25.24)- Hepatic steatosis. Mild intrahepatic and extra hepatic biliary ductal dilatation with CBD measuring up to 1.4 cm with distal tapering. No discrete choledocholith.  US Abd (04.25.24)- Hepatic steatosis. Status post cholecystectomy. Common bile duct measures 12 mm.    Abdominal pain now improved after having BM. Tolerating diet. Liver chemistry remains normal. No leucocytosis, remains afebrile.   Can advance diet to low fat diet  Patient can be d/c home from GI standpoint.   Cont Miralax daily for constipation       #Hepatic steatosis   CT A/P w/ IV Cont (04.25.24)- Hepatic steatosis    Maintain normal BMI  Low fat diet  Follow up with hepatologist Dr. Quintanilla for further assessment and monitoring

## 2024-04-27 NOTE — PROGRESS NOTE ADULT - ASSESSMENT
62 yo F with hx of cholelithiasis s/p cholecystectomy 26 years ago, gastroesophageal reflux disease and hyperlipidemia presents to the ED for right upper quadrant abdominal abdominal pain.    # RUQ pain, r/o Choledocholithiasis   CBD dilation on CT and US  LFTs WNL  lipase 59   MRCP with no choledocholithiasis. Nonspecific biliary ductal dilatation likely related to postcholecystectomy state.   - IVFs  - Advance diet to low fat  - PPI  - Pain control tylenol, morphine and bentyl   - Antiemetic as needed   - Add Miralax for constipation  - GI following     # GERD  - Resume Protonix     # Hepatic Steatosis  - Lifestyle modification  - Outpatient follow up with Dr. Quintanilla     ## Dyslipidemia  - resumed statin    # Elevated BP without diagnosis of HTN  Likely due to pain  - Monitor    VTE Prophylaxis - LMWH    Disposition: Home likely in 24-48 hours.

## 2024-04-27 NOTE — PROGRESS NOTE ADULT - SUBJECTIVE AND OBJECTIVE BOX
Chief Complaint: This is a 63y old woman patient being seen in follow-up consultation for abdominal pain, nausea, vomiting, Radiology finding of dilated CBD    Interval HPI / 24H events:  Patient seen and evaluated at bedside, reports mild RUQ pain. Denies nausea, vomiting, fever, chills. Tolerating clear liquid diet. No leucocytosis, remains afebrile. Liver enzymes remains normal. Reported 2 BM this morning. Abdominal pain improved after BM.      Review of Systems:  . Constitutional: No fever, chills  . HEENT: Negative  · Respiratory and Thorax: No shortness of breath, no cough  · Cardiovascular: No chest pain, palpitation, no dizziness  · Gastrointestinal: see above  · Genitourinary: No hematuria  · Musculoskeletal: Negative  · Neurological: negative  · Psychiatric: no agitation, no anxiety      PAST MEDICAL/SURGICAL HISTORY:  Hyperlipidemia    Cholelithiasis    S/P cholecystectomy    S/P hysterectomy      MEDICATIONS  (STANDING):  atorvastatin 40 milliGRAM(s) Oral at bedtime  enoxaparin Injectable 40 milliGRAM(s) SubCutaneous every 24 hours  pantoprazole    Tablet 40 milliGRAM(s) Oral daily  polyethylene glycol 3350 17 Gram(s) Oral daily  senna 2 Tablet(s) Oral at bedtime  sodium chloride 0.9%. 1000 milliLiter(s) (100 mL/Hr) IV Continuous <Continuous>    MEDICATIONS  (PRN):  acetaminophen     Tablet .. 650 milliGRAM(s) Oral every 6 hours PRN Temp greater or equal to 38C (100.4F), Mild Pain (1 - 3)  aluminum hydroxide/magnesium hydroxide/simethicone Suspension 30 milliLiter(s) Oral every 4 hours PRN Dyspepsia  dicyclomine Injectable 10 milliGRAM(s) IntraMuscular every 8 hours PRN Abdominal Pain  melatonin 3 milliGRAM(s) Oral at bedtime PRN Insomnia  morphine  - Injectable 2 milliGRAM(s) IV Push every 4 hours PRN Severe Pain (7 - 10)  ondansetron Injectable 4 milliGRAM(s) IV Push every 8 hours PRN Nausea and/or Vomiting    No Known Allergies    T(C): 36.5 (04-27-24 @ 10:43), Max: 36.9 (04-27-24 @ 04:10)  HR: 59 (04-27-24 @ 10:43) (55 - 62)  BP: 142/84 (04-27-24 @ 10:43) (121/73 - 142/84)  RR: 12 (04-27-24 @ 10:43) (12 - 16)  SpO2: 93% (04-27-24 @ 10:43) (93% - 95%)    I&O's Summary    26 Apr 2024 07:01  -  27 Apr 2024 07:00  --------------------------------------------------------  IN: 3130 mL / OUT: 0 mL / NET: 3130 mL      PHYSICAL EXAM:    Constitutional: No acute distress  Neuro: Awake alert  HEENT: anicteric sclerae  CV: regular rate, regular rhythm  Pulm/chest: lung sounds diminished bilaterally, no accessory muscle use noted  Abd: soft, +mild RUQ tenderness. Nondistended +bowel sounds. No rigidity, rebound tenderness, or guarding  Ext: no edema  Skin: warm, no jaundice   Psych: calm, cooperative      LABS:               13.6   5.25  )-----------( 241      ( 04-26 @ 05:48 )             41.3                15.1   9.06  )-----------( 265      ( 04-24 @ 23:20 )             43.8       04-27    140  |  107  |  4.7<L>  ----------------------------<  91  3.7   |  22.0  |  0.49<L>    Ca    8.5      27 Apr 2024 06:35  Mg     1.6     04-27    TPro  5.8<L>  /  Alb  3.4  /  TBili  0.4  /  DBili  x   /  AST  16  /  ALT  15  /  AlkPhos  67  04-27    LIVER FUNCTIONS - ( 27 Apr 2024 06:35 )  Alb: 3.4 g/dL / Pro: 5.8 g/dL / ALK PHOS: 67 U/L / ALT: 15 U/L / AST: 16 U/L / GGT: x             Lipase: 59 U/L (04-24-24 @ 23:20)      < from: MR MRCP w/wo IV Cont (04.25.24 @ 21:10) >  FINDINGS:  Multiple sequences degraded by motion artifact.    LOWER CHEST: Within normal limits.    LIVER: Geographic regions of steatosis in the right hepatic lobe.  BILE DUCTS: Nonspecific dilatation of the biliary tree, common bile duct   measuring 14 mm. Findings within normal limits for a patient who is   undergone prior cholecystectomy. Tapered appearance of the distal duct   without filling defect.  GALLBLADDER: Cholecystectomy.  SPLEEN: Within normal limits.  PANCREAS: Within normal limits.  ADRENALS: Within normal limits.  KIDNEYS/URETERS: Within normal limits.    VISUALIZED PORTIONS:  BOWEL: Within normal limits.  PERITONEUM: No ascites.  VESSELS: Within normal limits.  RETROPERITONEUM/LYMPH NODES: No lymphadenopathy.  ABDOMINAL WALL: Within normal limits.  BONES: Within normal limits.    IMPRESSION:  Nonspecific biliary ductal dilatation, likely related to   postcholecystectomy state.  No MR visible common bile duct stone.    < end of copied text >

## 2024-04-28 ENCOUNTER — TRANSCRIPTION ENCOUNTER (OUTPATIENT)
Age: 64
End: 2024-04-28

## 2024-04-28 VITALS
TEMPERATURE: 97 F | DIASTOLIC BLOOD PRESSURE: 89 MMHG | OXYGEN SATURATION: 98 % | HEART RATE: 71 BPM | RESPIRATION RATE: 18 BRPM | SYSTOLIC BLOOD PRESSURE: 130 MMHG

## 2024-04-28 PROCEDURE — 74183 MRI ABD W/O CNTR FLWD CNTR: CPT | Mod: MC

## 2024-04-28 PROCEDURE — 99285 EMERGENCY DEPT VISIT HI MDM: CPT | Mod: 25

## 2024-04-28 PROCEDURE — T1013: CPT

## 2024-04-28 PROCEDURE — 71046 X-RAY EXAM CHEST 2 VIEWS: CPT

## 2024-04-28 PROCEDURE — 85610 PROTHROMBIN TIME: CPT

## 2024-04-28 PROCEDURE — 85379 FIBRIN DEGRADATION QUANT: CPT

## 2024-04-28 PROCEDURE — 36415 COLL VENOUS BLD VENIPUNCTURE: CPT

## 2024-04-28 PROCEDURE — 76705 ECHO EXAM OF ABDOMEN: CPT

## 2024-04-28 PROCEDURE — 96374 THER/PROPH/DIAG INJ IV PUSH: CPT

## 2024-04-28 PROCEDURE — 96375 TX/PRO/DX INJ NEW DRUG ADDON: CPT

## 2024-04-28 PROCEDURE — 99239 HOSP IP/OBS DSCHRG MGMT >30: CPT

## 2024-04-28 PROCEDURE — 85025 COMPLETE CBC W/AUTO DIFF WBC: CPT

## 2024-04-28 PROCEDURE — 83605 ASSAY OF LACTIC ACID: CPT

## 2024-04-28 PROCEDURE — 80053 COMPREHEN METABOLIC PANEL: CPT

## 2024-04-28 PROCEDURE — 83690 ASSAY OF LIPASE: CPT

## 2024-04-28 PROCEDURE — 74177 CT ABD & PELVIS W/CONTRAST: CPT | Mod: MC

## 2024-04-28 PROCEDURE — 83735 ASSAY OF MAGNESIUM: CPT

## 2024-04-28 PROCEDURE — 93005 ELECTROCARDIOGRAM TRACING: CPT

## 2024-04-28 RX ORDER — SENNA PLUS 8.6 MG/1
2 TABLET ORAL
Qty: 0 | Refills: 0 | DISCHARGE
Start: 2024-04-28

## 2024-04-28 RX ORDER — PANTOPRAZOLE SODIUM 20 MG/1
1 TABLET, DELAYED RELEASE ORAL
Qty: 30 | Refills: 0
Start: 2024-04-28 | End: 2024-05-27

## 2024-04-28 RX ADMIN — PANTOPRAZOLE SODIUM 40 MILLIGRAM(S): 20 TABLET, DELAYED RELEASE ORAL at 10:03

## 2024-04-28 NOTE — DISCHARGE NOTE PROVIDER - CARE PROVIDER_API CALL
Val Quintanilla  Transplant Hepatology  [address], [address]  [place] [address]  Phone: [phone]  Fax: [phone]  Follow Up Time:

## 2024-04-28 NOTE — DISCHARGE NOTE NURSING/CASE MANAGEMENT/SOCIAL WORK - PATIENT PORTAL LINK FT
You can access the FollowMyHealth Patient Portal offered by [facility] by registering at the following website: [email or web address]. By joining [employer]’s FollowMyHealth portal, you will also be able to view your health information using other applications (apps) compatible with our system.

## 2024-04-28 NOTE — DISCHARGE NOTE PROVIDER - NSDCMRMEDTOKEN_GEN_ALL_CORE_FT
atorvastatin 40 mg oral tablet: 1 tab(s) orally once a day (at bedtime)  dicyclomine 20 mg oral tablet: 1 tab(s) orally 4 times a day as needed for abdominal pain  pantoprazole 40 mg oral delayed release tablet: 1 tab(s) orally once a day  senna leaf extract oral tablet: 2 tab(s) orally once a day (at bedtime)

## 2024-04-28 NOTE — DISCHARGE NOTE PROVIDER - HOSPITAL COURSE
64 yo F with hx of cholelithiasis s/p cholecystectomy 26 years ago, gastroesophageal reflux disease and hyperlipidemia presents to the ED for right upper quadrant abdominal pain.    Initial imaging which included CT scan and US abdomen showed CBD dilatation without any stones. Transaminases were normal. MRCP performed which did not show any  choledocholithiasis. Nonspecific biliary ductal dilatation likely related to postcholecystectomy state. She improved with symptomatic treatment and is tolerating low fat diet.   Evaluated by GI with recommendations for discharge with close outpatient follow up

## 2024-04-28 NOTE — CHART NOTE - NSCHARTNOTEFT_GEN_A_CORE
Ms Leanna Lui was admitted to [facility] from 4/25-28/24 and may resume her work from 4/29/24.  Please call if any questions. [phone]

## 2024-04-28 NOTE — DISCHARGE NOTE PROVIDER - ATTENDING DISCHARGE PHYSICAL EXAMINATION:
OBJECTIVE:  Vital Signs Last 24 Hrs  T(C): 36.3 (28 Apr 2024 08:12), Max: 36.9 (28 Apr 2024 04:15)  T(F): 97.3 (28 Apr 2024 08:12), Max: 98.4 (28 Apr 2024 04:15)  HR: 71 (28 Apr 2024 08:12) (58 - 71)  BP: 130/89 (28 Apr 2024 08:12) (119/77 - 147/88)  BP(mean): --  RR: 18 (28 Apr 2024 08:12) (12 - 18)  SpO2: 98% (28 Apr 2024 08:12) (93% - 98%)    Parameters below as of 28 Apr 2024 08:12  Patient On (Oxygen Delivery Method): room air    PHYSICAL EXAMINATION  General: Obese female lying in bed, appears comfortable  HEENT:  Anicteric sclera  NECK:  Supple  CVS: regular rate and rhythm S1 S2  RESP:  Fair air entry bilaterally  GI:  Soft with RUQ tenderness, BS(+)  : No suprapubic tenderness  MSK:  No edema; moves all extremities  CNS:  Awake, alert, oriented. Fluent speech, follows command  INTEG:  No jaundice  PSYCH:  Fair mood

## 2024-04-28 NOTE — DISCHARGE NOTE PROVIDER - NSDCCPCAREPLAN_GEN_ALL_CORE_FT
PRINCIPAL DISCHARGE DIAGNOSIS  Diagnosis: RUQ pain  Assessment and Plan of Treatment: Medications as prescribed  Follow up with GI      SECONDARY DISCHARGE DIAGNOSES  Diagnosis: Dyslipidemia  Assessment and Plan of Treatment:     Diagnosis: Hepatic steatosis  Assessment and Plan of Treatment: Follow up with GI

## 2024-04-28 NOTE — DISCHARGE NOTE NURSING/CASE MANAGEMENT/SOCIAL WORK - NSDCPEFALRISK_GEN_ALL_CORE
For information on Fall & Injury Prevention, visit: [email or web address] OR  [email or web address] OR  https://www.cdc.gov/steadi/patient.html

## 2024-04-29 PROBLEM — E78.5 HYPERLIPIDEMIA, UNSPECIFIED: Chronic | Status: ACTIVE | Noted: 2024-04-25

## 2024-04-29 PROBLEM — K80.20 CALCULUS OF GALLBLADDER WITHOUT CHOLECYSTITIS WITHOUT OBSTRUCTION: Chronic | Status: ACTIVE | Noted: 2024-04-25

## 2024-05-21 ENCOUNTER — APPOINTMENT (OUTPATIENT)
Dept: GASTROENTEROLOGY | Facility: CLINIC | Age: 64
End: 2024-05-21
Payer: COMMERCIAL

## 2024-05-21 VITALS
RESPIRATION RATE: 14 BRPM | SYSTOLIC BLOOD PRESSURE: 135 MMHG | HEART RATE: 78 BPM | HEIGHT: 58 IN | BODY MASS INDEX: 30.64 KG/M2 | WEIGHT: 146 LBS | OXYGEN SATURATION: 98 % | DIASTOLIC BLOOD PRESSURE: 85 MMHG

## 2024-05-21 DIAGNOSIS — K21.9 GASTRO-ESOPHAGEAL REFLUX DISEASE W/OUT ESOPHAGITIS: ICD-10-CM

## 2024-05-21 DIAGNOSIS — R10.11 RIGHT UPPER QUADRANT PAIN: ICD-10-CM

## 2024-05-21 DIAGNOSIS — A04.8 OTHER SPECIFIED BACTERIAL INTESTINAL INFECTIONS: ICD-10-CM

## 2024-05-21 PROCEDURE — 99214 OFFICE O/P EST MOD 30 MIN: CPT

## 2024-05-21 RX ORDER — ATORVASTATIN CALCIUM 40 MG/1
40 TABLET, FILM COATED ORAL
Refills: 0 | Status: ACTIVE | COMMUNITY

## 2024-05-21 RX ORDER — PANTOPRAZOLE 40 MG/1
40 TABLET, DELAYED RELEASE ORAL DAILY
Qty: 30 | Refills: 5 | Status: ACTIVE | COMMUNITY
Start: 2024-05-21 | End: 1900-01-01

## 2024-05-21 RX ORDER — UBIDECARENONE/VIT E ACET 100MG-5
CAPSULE ORAL
Refills: 0 | Status: ACTIVE | COMMUNITY

## 2024-05-21 NOTE — HISTORY OF PRESENT ILLNESS
[FreeTextEntry1] : The patient has been previously seen in our practice for gastroesophageal reflux without esophagitis.  She is being seen in follow-up as she required hospitalization from April 25 through April 28 at [facility] where she presented with a 1 to 2-week history of right subcostal pain located in the mid axillary line.  She underwent a CAT scan and ultrasound that showed a dilated common bile duct to 1.2 cm but transaminases were normal.  An MRCP was otherwise unremarkable with no evidence of common bile duct stones.  She was continued on pantoprazole 40 mg p.o. every morning which he takes for underlying gastroesophageal reflux without esophagitis and since discharge she has been completely asymptomatic with no further pain.  There is no nausea or vomiting.  In August 2017 she underwent a routine screening colonoscopy by Dr. Miguel that was normal and he recommended a follow-up exam in 2027.  In 2019 she underwent an upper endoscopy by Dr. Tucker for evaluation of chronic heartburn that was normal other than some erythema and erosion in the antrum.  Biopsies revealed H. pylori for which she was treated and a subsequent urea breath test was negative.  She has been taking pantoprazole ever since and denies any heartburn.  Her appetite and weight are stable.  There is no diarrhea or constipation.  She denies any rectal bleeding or melena.

## 2024-05-21 NOTE — PHYSICAL EXAM
[Alert] : alert [Normal Voice/Communication] : normal voice/communication [Healthy Appearing] : healthy appearing [No Acute Distress] : no acute distress [Sclera] : the sclera and conjunctiva were normal [Hearing Threshold Finger Rub Not [place]] : hearing was normal [Normal Lips/Gums] : the lips and gums were normal [Oropharynx] : the oropharynx was normal [Normal Appearance] : the appearance of the neck was normal [No Respiratory Distress] : no respiratory distress [No Acc Muscle Use] : no accessory muscle use [Respiration, Rhythm And Depth] : normal respiratory rhythm and effort [Heart Rate And Rhythm] : heart rate was normal and rhythm regular [Bowel Sounds] : normal bowel sounds [Abdomen Tenderness] : non-tender [No Masses] : no abdominal mass palpated [Abdomen Soft] : soft [] : no hepatosplenomegaly [Oriented To Time, Place, And Person] : oriented to person, place, and time

## 2024-05-21 NOTE — ASSESSMENT
[FreeTextEntry1] : In all probability the patient's pain was musculoskeletal and has resolved.  I do not think any further evaluation is necessary at this time.  I have simply recommended that she continue to take pantoprazole 40 mg p.o. every morning and that she follow-up with me in 1 year for routine follow-up.

## 2024-12-16 NOTE — PATIENT PROFILE ADULT - HAS THE PATIENT RECEIVED THE INFLUENZA VACCINE THIS SEASON?
December 16, 2024      [facility] [address]   [facility] [address]  Phone: [phone]  Fax: [phone]       Patient: Tay Maldonado   YOB: 2016  Date of Visit: 12/16/2024    To Whom It May Concern:    Myla Maldonado  was at [facility] on 12/16/2024. s. If you have any questions or concerns, or if I can be of further assistance, please do not hesitate to contact me.    Sincerely,    Electronically signed by Dr. Hina Chambers MD      
unable to assess immunization status...

## 2025-08-22 ENCOUNTER — APPOINTMENT (OUTPATIENT)
Dept: NEUROLOGY | Facility: CLINIC | Age: 65
End: 2025-08-22
Payer: COMMERCIAL

## 2025-08-22 ENCOUNTER — NON-APPOINTMENT (OUTPATIENT)
Age: 65
End: 2025-08-22

## 2025-08-22 VITALS
WEIGHT: 158 LBS | DIASTOLIC BLOOD PRESSURE: 87 MMHG | HEIGHT: 58 IN | SYSTOLIC BLOOD PRESSURE: 125 MMHG | BODY MASS INDEX: 33.17 KG/M2

## 2025-08-22 DIAGNOSIS — S06.0X9A CONCUSSION WITH LOSS OF CONSCIOUSNESS OF UNSPECIFIED DURATION, INITIAL ENCOUNTER: ICD-10-CM

## 2025-08-22 PROCEDURE — 99204 OFFICE O/P NEW MOD 45 MIN: CPT

## 2025-08-22 RX ORDER — OMEPRAZOLE 20 MG/1
TABLET, DELAYED RELEASE ORAL
Refills: 0 | Status: ACTIVE | COMMUNITY

## 2025-08-22 RX ORDER — ATORVASTATIN CALCIUM 20 MG/1
20 TABLET, FILM COATED ORAL
Refills: 0 | Status: ACTIVE | COMMUNITY

## 2025-09-13 ENCOUNTER — APPOINTMENT (OUTPATIENT)
Dept: CT IMAGING | Facility: CLINIC | Age: 65
End: 2025-09-13
Payer: COMMERCIAL

## 2025-09-13 PROCEDURE — 70450 CT HEAD/BRAIN W/O DYE: CPT | Mod: 26
